# Patient Record
Sex: FEMALE | Race: WHITE | NOT HISPANIC OR LATINO | Employment: FULL TIME | URBAN - METROPOLITAN AREA
[De-identification: names, ages, dates, MRNs, and addresses within clinical notes are randomized per-mention and may not be internally consistent; named-entity substitution may affect disease eponyms.]

---

## 2017-09-27 ENCOUNTER — APPOINTMENT (OUTPATIENT)
Dept: WOMENS IMAGING | Facility: HOSPITAL | Age: 47
End: 2017-09-27

## 2017-09-27 PROCEDURE — G0202 SCR MAMMO BI INCL CAD: HCPCS | Performed by: RADIOLOGY

## 2017-09-27 PROCEDURE — 77067 SCR MAMMO BI INCL CAD: CPT | Performed by: RADIOLOGY

## 2017-09-27 PROCEDURE — 77063 BREAST TOMOSYNTHESIS BI: CPT | Performed by: RADIOLOGY

## 2018-01-04 ENCOUNTER — APPOINTMENT (OUTPATIENT)
Dept: WOMENS IMAGING | Facility: HOSPITAL | Age: 48
End: 2018-01-04

## 2018-01-04 PROCEDURE — 77080 DXA BONE DENSITY AXIAL: CPT | Performed by: RADIOLOGY

## 2018-06-12 ENCOUNTER — CLINICAL SUPPORT (OUTPATIENT)
Dept: OTHER | Facility: HOSPITAL | Age: 48
End: 2018-06-12

## 2018-06-12 DIAGNOSIS — Z80.3 FAMILY HISTORY OF BREAST CANCER: Primary | ICD-10-CM

## 2018-06-12 PROCEDURE — G0463 HOSPITAL OUTPT CLINIC VISIT: HCPCS

## 2018-06-12 NOTE — PATIENT INSTRUCTIONS
Pt seen by Dr. Saxena for genetic counseling and testing. Lab drawn with 21g butterfly needle in Left AC x 1 attempt. Sent to [facility]. Pt informed she would receive a phone call when test results and will be scheduled for a follow up appointment.

## 2018-08-01 ENCOUNTER — TRANSCRIBE ORDERS (OUTPATIENT)
Dept: ADMINISTRATIVE | Facility: HOSPITAL | Age: 48
End: 2018-08-01

## 2018-08-01 DIAGNOSIS — Z12.39 BREAST CANCER SCREENING: Primary | ICD-10-CM

## 2018-09-18 ENCOUNTER — HOSPITAL ENCOUNTER (OUTPATIENT)
Dept: MRI IMAGING | Facility: HOSPITAL | Age: 48
Discharge: HOME | End: 2018-09-18
Attending: INTERNAL MEDICINE | Admitting: INTERNAL MEDICINE

## 2018-09-18 DIAGNOSIS — Z12.39 BREAST CANCER SCREENING: ICD-10-CM

## 2018-09-18 PROCEDURE — 0 GADOBENATE DIMEGLUMINE 529 MG/ML SOLUTION: Performed by: INTERNAL MEDICINE

## 2018-09-18 PROCEDURE — A9577 INJ MULTIHANCE: HCPCS | Performed by: INTERNAL MEDICINE

## 2018-09-18 PROCEDURE — C8908 MRI W/O FOL W/CONT, BREAST,: HCPCS

## 2018-09-18 PROCEDURE — 82565 ASSAY OF CREATININE: CPT

## 2018-09-18 RX ADMIN — GADOBENATE DIMEGLUMINE 15 ML: 529 INJECTION, SOLUTION INTRAVENOUS at 19:24

## 2018-09-19 LAB — CREAT BLDA-MCNC: 0.9 MG/DL (ref 0.6–1.3)

## 2019-03-06 ENCOUNTER — APPOINTMENT (OUTPATIENT)
Dept: WOMENS IMAGING | Facility: HOSPITAL | Age: 49
End: 2019-03-06

## 2019-03-06 PROCEDURE — 77067 SCR MAMMO BI INCL CAD: CPT | Performed by: RADIOLOGY

## 2019-03-06 PROCEDURE — 77063 BREAST TOMOSYNTHESIS BI: CPT | Performed by: RADIOLOGY

## 2020-03-09 ENCOUNTER — APPOINTMENT (OUTPATIENT)
Dept: WOMENS IMAGING | Facility: HOSPITAL | Age: 50
End: 2020-03-09

## 2020-03-09 PROCEDURE — 77063 BREAST TOMOSYNTHESIS BI: CPT | Performed by: RADIOLOGY

## 2020-03-09 PROCEDURE — 77067 SCR MAMMO BI INCL CAD: CPT | Performed by: RADIOLOGY

## 2020-08-31 ENCOUNTER — TRANSCRIBE ORDERS (OUTPATIENT)
Dept: ADMINISTRATIVE | Facility: HOSPITAL | Age: 50
End: 2020-08-31

## 2020-08-31 DIAGNOSIS — Z80.3 FAMILY HX-BREAST MALIGNANCY: Primary | ICD-10-CM

## 2020-09-15 ENCOUNTER — HOSPITAL ENCOUNTER (OUTPATIENT)
Dept: MRI IMAGING | Facility: HOSPITAL | Age: 50
Discharge: HOME | End: 2020-09-15
Admitting: INTERNAL MEDICINE

## 2020-09-15 DIAGNOSIS — Z80.3 FAMILY HX-BREAST MALIGNANCY: ICD-10-CM

## 2020-09-15 LAB — CREAT BLDA-MCNC: 0.7 MG/DL (ref 0.6–1.3)

## 2020-09-15 PROCEDURE — 77049 MRI BREAST C-+ W/CAD BI: CPT

## 2020-09-15 PROCEDURE — A9577 INJ MULTIHANCE: HCPCS | Performed by: INTERNAL MEDICINE

## 2020-09-15 PROCEDURE — 82565 ASSAY OF CREATININE: CPT

## 2020-09-15 PROCEDURE — 0 GADOBENATE DIMEGLUMINE 529 MG/ML SOLUTION: Performed by: INTERNAL MEDICINE

## 2020-09-15 RX ADMIN — GADOBENATE DIMEGLUMINE 15 ML: 529 INJECTION, SOLUTION INTRAVENOUS at 16:24

## 2021-03-16 ENCOUNTER — APPOINTMENT (OUTPATIENT)
Dept: WOMENS IMAGING | Facility: HOSPITAL | Age: 51
End: 2021-03-16

## 2021-03-16 PROCEDURE — 77063 BREAST TOMOSYNTHESIS BI: CPT | Performed by: RADIOLOGY

## 2021-03-16 PROCEDURE — 77067 SCR MAMMO BI INCL CAD: CPT | Performed by: RADIOLOGY

## 2021-03-30 ENCOUNTER — BULK ORDERING (OUTPATIENT)
Dept: CASE MANAGEMENT | Facility: OTHER | Age: 51
End: 2021-03-30

## 2021-03-30 DIAGNOSIS — Z23 IMMUNIZATION DUE: ICD-10-CM

## 2021-09-01 ENCOUNTER — TRANSCRIBE ORDERS (OUTPATIENT)
Dept: ADMINISTRATIVE | Facility: HOSPITAL | Age: 51
End: 2021-09-01

## 2021-09-01 DIAGNOSIS — Z84.2: Primary | ICD-10-CM

## 2021-09-27 ENCOUNTER — HOSPITAL ENCOUNTER (OUTPATIENT)
Dept: MRI IMAGING | Facility: HOSPITAL | Age: 51
Discharge: HOME | End: 2021-09-27
Admitting: INTERNAL MEDICINE

## 2021-09-27 DIAGNOSIS — Z84.2: ICD-10-CM

## 2021-09-27 PROCEDURE — 0 GADOBENATE DIMEGLUMINE 529 MG/ML SOLUTION: Performed by: INTERNAL MEDICINE

## 2021-09-27 PROCEDURE — 77049 MRI BREAST C-+ W/CAD BI: CPT

## 2021-09-27 PROCEDURE — 82565 ASSAY OF CREATININE: CPT

## 2021-09-27 PROCEDURE — A9577 INJ MULTIHANCE: HCPCS | Performed by: INTERNAL MEDICINE

## 2021-09-27 RX ADMIN — GADOBENATE DIMEGLUMINE 13 ML: 529 INJECTION, SOLUTION INTRAVENOUS at 20:02

## 2021-09-29 ENCOUNTER — TRANSCRIBE ORDERS (OUTPATIENT)
Dept: ADMINISTRATIVE | Facility: HOSPITAL | Age: 51
End: 2021-09-29

## 2021-10-04 LAB — CREAT BLDA-MCNC: 0.8 MG/DL (ref 0.6–1.3)

## 2021-10-08 RX ORDER — CETIRIZINE HYDROCHLORIDE 10 MG/1
10 TABLET ORAL DAILY
COMMUNITY
End: 2021-12-14

## 2021-10-08 RX ORDER — ESCITALOPRAM OXALATE 20 MG/1
20 TABLET ORAL DAILY
COMMUNITY

## 2021-10-08 RX ORDER — MULTIPLE VITAMINS W/ MINERALS TAB 9MG-400MCG
1 TAB ORAL DAILY
COMMUNITY

## 2021-10-12 ENCOUNTER — HOSPITAL ENCOUNTER (OUTPATIENT)
Dept: MRI IMAGING | Facility: HOSPITAL | Age: 51
Discharge: HOME | End: 2021-10-12

## 2021-10-12 ENCOUNTER — HOSPITAL ENCOUNTER (OUTPATIENT)
Dept: MAMMOGRAPHY | Facility: HOSPITAL | Age: 51
Discharge: HOME | End: 2021-10-12

## 2021-10-12 VITALS
OXYGEN SATURATION: 100 % | HEIGHT: 61 IN | SYSTOLIC BLOOD PRESSURE: 122 MMHG | WEIGHT: 150 LBS | DIASTOLIC BLOOD PRESSURE: 86 MMHG | RESPIRATION RATE: 16 BRPM | BODY MASS INDEX: 28.32 KG/M2 | TEMPERATURE: 97.8 F | HEART RATE: 70 BPM

## 2021-10-12 DIAGNOSIS — Z98.890 STATUS POST BIOPSY: ICD-10-CM

## 2021-10-12 DIAGNOSIS — R92.8 ABNORMAL MRI, BREAST: ICD-10-CM

## 2021-10-12 LAB — CREAT BLDA-MCNC: 0.7 MG/DL (ref 0.6–1.3)

## 2021-10-12 PROCEDURE — 88342 IMHCHEM/IMCYTCHM 1ST ANTB: CPT | Performed by: INTERNAL MEDICINE

## 2021-10-12 PROCEDURE — 0 GADOBENATE DIMEGLUMINE 529 MG/ML SOLUTION: Performed by: INTERNAL MEDICINE

## 2021-10-12 PROCEDURE — 25010000003 LIDOCAINE 1 % SOLUTION: Performed by: INTERNAL MEDICINE

## 2021-10-12 PROCEDURE — 88341 IMHCHEM/IMCYTCHM EA ADD ANTB: CPT | Performed by: INTERNAL MEDICINE

## 2021-10-12 PROCEDURE — A4648 IMPLANTABLE TISSUE MARKER: HCPCS

## 2021-10-12 PROCEDURE — 88305 TISSUE EXAM BY PATHOLOGIST: CPT | Performed by: INTERNAL MEDICINE

## 2021-10-12 PROCEDURE — 82565 ASSAY OF CREATININE: CPT

## 2021-10-12 PROCEDURE — 77065 DX MAMMO INCL CAD UNI: CPT

## 2021-10-12 PROCEDURE — A9577 INJ MULTIHANCE: HCPCS | Performed by: INTERNAL MEDICINE

## 2021-10-12 RX ORDER — LIDOCAINE HYDROCHLORIDE 10 MG/ML
1 INJECTION, SOLUTION INFILTRATION; PERINEURAL ONCE
Status: COMPLETED | OUTPATIENT
Start: 2021-10-12 | End: 2021-10-12

## 2021-10-12 RX ORDER — DIAZEPAM 5 MG/1
5 TABLET ORAL ONCE AS NEEDED
Status: DISCONTINUED | OUTPATIENT
Start: 2021-10-12 | End: 2021-10-13 | Stop reason: HOSPADM

## 2021-10-12 RX ORDER — DIAZEPAM 5 MG/1
5 TABLET ORAL ONCE AS NEEDED
Status: CANCELLED | OUTPATIENT
Start: 2021-10-12

## 2021-10-12 RX ADMIN — LIDOCAINE HYDROCHLORIDE 1 ML: 10 INJECTION, SOLUTION INFILTRATION; PERINEURAL at 08:41

## 2021-10-12 RX ADMIN — LIDOCAINE HYDROCHLORIDE 16 ML: 10; .005 INJECTION, SOLUTION EPIDURAL; INFILTRATION; INTRACAUDAL; PERINEURAL at 10:08

## 2021-10-12 RX ADMIN — GADOBENATE DIMEGLUMINE 14 ML: 529 INJECTION, SOLUTION INTRAVENOUS at 08:23

## 2021-10-12 NOTE — H&P
Name: Mitra Celis ADMIT: 10/12/2021   : 1970  PCP: Garrick Oneal MD    MRN: [id number] LOS: 0 days   AGE/SEX: 51 y.o. female  ROOM: Room/bed info not found       Chief complaint right breast lesion on MRI    Present Illness or Internal History:  Patient is a 51 y.o. female presents with a right breast lesion on MRI.     Past Surgical History:  Past Surgical History:   Procedure Laterality Date   • DILATATION AND CURETTAGE     • ENDOMETRIAL CRYOABLATION     • MOLE REMOVAL     • SINUS SURGERY         Past Medical History:  Past Medical History:   Diagnosis Date   • Allergic rhinitis    • Anxiety and depression    • Headache        Home Medications:  (Not in a hospital admission)      Allergies:  Aleve [naproxen sodium] and Latex    Family History:  Family History   Problem Relation Age of Onset   • Breast cancer Mother    • Breast cancer Maternal Grandmother    • Diabetes Paternal Grandmother    • Breast cancer Maternal Aunt        Social History:  Social History     Tobacco Use   • Smoking status: Not on file   • Smokeless tobacco: Not on file   Substance Use Topics   • Alcohol use: Not on file   • Drug use: Not on file        Objective     Physical Exam:    No exam performed today,    Vital Signs  Temp:  [97.8 °F (36.6 °C)] 97.8 °F (36.6 °C)  Heart Rate:  [65] 65  Resp:  [16] 16  BP: (136)/(83) 136/83    Anticipated Surgical Procedure:  MRI guided vacuum assisted right breast biopsy with clip placement    The risks, benefits and alternatives of this procedure have been discussed with the patient or responsible party: Yes        Mayito Roldan Jr., MD  10/12/21  07:32 EDT

## 2021-10-12 NOTE — NURSING NOTE
Patient brought Valium 10 mg PO with her which was prescribed by her physician. Valium taken at 0730 a.m.

## 2021-10-12 NOTE — NURSING NOTE
Biopsy site to right outer breast clear with Dermabond dry and intact. No firmness or swelling noted at or around biopsy site. Denies pain. Tiny bruise present on contralateral side of the right breast. Ice pack with protective covering applied to biopsy site. Patient groggy but awake and oriented x4. Discharge instructions discussed with understanding voiced by patient. Copies provided to patient. No distress noted. To patient discharge via wheelchair per this nurse. To home via private vehicle driven by her .

## 2021-10-13 LAB
LAB AP CASE REPORT: NORMAL
LAB AP DIAGNOSIS COMMENT: NORMAL
LAB AP SPECIAL STAINS: NORMAL
PATH REPORT.FINAL DX SPEC: NORMAL
PATH REPORT.GROSS SPEC: NORMAL

## 2021-10-14 ENCOUNTER — TELEPHONE (OUTPATIENT)
Dept: SURGERY | Facility: CLINIC | Age: 51
End: 2021-10-14

## 2021-10-14 NOTE — TELEPHONE ENCOUNTER
New patient appointment with Dr. Costa is scheduled on 11/24/2021 @ 8:00am.    Called and spoke to patient, patient expressed v/u of appointment time.    Sent patient a reminder letter in the mail.

## 2021-11-24 ENCOUNTER — OFFICE VISIT (OUTPATIENT)
Dept: SURGERY | Facility: CLINIC | Age: 51
End: 2021-11-24

## 2021-11-24 ENCOUNTER — PREP FOR SURGERY (OUTPATIENT)
Dept: OTHER | Facility: HOSPITAL | Age: 51
End: 2021-11-24

## 2021-11-24 VITALS
WEIGHT: 157 LBS | OXYGEN SATURATION: 98 % | SYSTOLIC BLOOD PRESSURE: 141 MMHG | BODY MASS INDEX: 29.64 KG/M2 | DIASTOLIC BLOOD PRESSURE: 86 MMHG | HEIGHT: 61 IN | RESPIRATION RATE: 18 BRPM | HEART RATE: 75 BPM

## 2021-11-24 DIAGNOSIS — N60.91 ATYPICAL LOBULAR HYPERPLASIA (ALH) OF RIGHT BREAST: Primary | ICD-10-CM

## 2021-11-24 DIAGNOSIS — Z91.89 INCREASED RISK OF BREAST CANCER: ICD-10-CM

## 2021-11-24 PROCEDURE — 99205 OFFICE O/P NEW HI 60 MIN: CPT | Performed by: SURGERY

## 2021-11-24 RX ORDER — DIAZEPAM 5 MG/1
10 TABLET ORAL ONCE
Status: CANCELLED | OUTPATIENT
Start: 2021-11-24 | End: 2021-11-24

## 2021-11-24 NOTE — PROGRESS NOTES
[facility]     Referring Provider: Garrick Oneal MD     Chief complaint: Right breast atypical lobular hyperplasia      HPI: Ms. Mitra Celis is a 52 yo woman, seen at the request of Dr. Garrick Oneal, for a new diagnosis of right breast atypical lobular hyperplasia (ALH). The patient has been in high risk screening for breast cancer for the past few years. On recent routine screening MRI, there was a new area of right breast enhancement. Subsequent biopsy showed focal ALH. Her work-up is detailed in the breast history section below. Prior to the biopsy she denies any breast lumps, pain, skin changes, or nipple discharge. She denies any prior history of abnormal mammograms or breast biopsies.     She has a family history of breast cancer in her mother (diagnosed at age 60 and at 81 with a second primary cancer in the contralateral breast), her maternal grandmother (diagnosed in her 60s) and maternal great grandmother (unknown age of diagnosis). She denies any family history of ovarian cancer. Both the patient and her mother had negative expanded panel genetic testing. She was joined today in clinic by her . She gave consent for him to be present during her examination and participate in the discussion.       I personally reviewed her records and summarized her relevant breast history/imagin2018, [facility] Common Hereditary Cancers Panel (46 genes): Negative    2020, Screening MMG with M[facility]ep ([facility]):  Heterogeneously dense. There are stable areas of asymmetric breast tissue seen in both breasts. No suspicious masses, suspicious microcalcifications or architectural distortions are identified. There has been no significant change from the prior exam(s).  BI-RADS 2: Benign.    09/15/2020, Bilateral Breast MRI ([facility]):  Right breast: No suspicious enhancing mass or area of nonmass enhancement is identified. The visualized axilla is within normal limits.   Left breast: No suspicious  enhancing mass or nonmass enhancement is identified. The visualized axilla is within normal limits.   Extramammary findings: There are abnormally enlarged internal mammary chain lymph nodes on either side.  BI-RADS 1: Negative.    03/16/2021, Screening MMG with Mandeep ([facility]):  Heterogeneously dense. No suspicious masses, significant calcifications or other abnormalities are seen.  BI-RADS 1: Negative.    09/27/2021, [facility]  Centered in the posterior one third lower inner quadrant of the right breast at the 3:30 position on the order of 5 cm from the nipple there is linear enhancement that measures on the order of 2.1 cm in the anterior posterior dimension, 0.5 cm in the superior-inferior dimension and 0.4 cm in the medial to lateral dimension. This has developed since the prior examination.   No other areas of abnormal enhancement or morphology are seen in the right breast. I see no evidence for abnormal right breast skin, nipple or chest wall enhancement and there is no evidence for right axillary or internal mammary chain adenopathy.   There are no areas of abnormal enhancement or morphology in the left breast. I see no evidence for abnormal left breast skin, nipple or chest wall enhancement and there is no evidence for left axillary or internal mammary chain adenopathy.  BI-RADS 4: Suspicious.    10/12/2021, Right Breast, MR-Guided Biopsy ( [facility]):  1.  Right Breast, 3:30, MRI-Guided Biopsies for Linear Enhancement:                A.  Benign breast parenchyma with scattered hyalinized stromal fibrosis and focal clustered microcysts.  B.  Focal atypical lobular hyperplasia (ALH).  C.  Microcalcifications associated with benign breast ducts.  D.  No evidence of in situ nor invasive carcinoma identified.      Review of Systems   Constitutional: Negative for appetite change, chills, diaphoresis, fatigue, fever and unexpected weight change.   HENT:   Negative for hearing loss, lump/mass,  mouth sores, nosebleeds, sore throat, tinnitus, trouble swallowing and voice change.    Eyes: Negative for eye problems and icterus.   Respiratory: Negative for chest tightness, cough, hemoptysis, shortness of breath and wheezing.    Cardiovascular: Negative for chest pain, leg swelling and palpitations.   Gastrointestinal: Negative for abdominal distention, abdominal pain, blood in stool, constipation, diarrhea, nausea, rectal pain and vomiting.   Endocrine: Negative for hot flashes.   Genitourinary: Negative for bladder incontinence, difficulty urinating, dyspareunia, dysuria, frequency, hematuria, menstrual problem, nocturia, pelvic pain, vaginal bleeding and vaginal discharge.    Musculoskeletal: Negative for arthralgias, back pain, flank pain, gait problem, myalgias, neck pain and neck stiffness.   Skin: Negative for itching, rash and wound.   Neurological: Negative for dizziness, extremity weakness, gait problem, headaches, light-headedness, numbness, seizures and speech difficulty.   Hematological: Negative for adenopathy. Does not bruise/bleed easily.   Psychiatric/Behavioral: Negative for confusion, decreased concentration, depression, sleep disturbance and suicidal ideas. The patient is not nervous/anxious.    I personally reviewed and updated the ROS.      Medications:    Current Outpatient Medications:   •  B COMPLEX VITAMINS PO, Take 1 tablet by mouth Daily., Disp: , Rfl:   •  cetirizine (zyrTEC) 10 MG tablet, Take 10 mg by mouth Daily., Disp: , Rfl:   •  escitalopram (LEXAPRO) 20 MG tablet, Take 20 mg by mouth Daily., Disp: , Rfl:   •  multivitamin with minerals (MULTIVITAMIN ADULT PO), Take 1 tablet by mouth Daily., Disp: , Rfl:       Allergies   Allergen Reactions   • Aleve [Naproxen Sodium] Anaphylaxis and Other (See Comments)     syncope   • Latex Rash     rash       Past Medical History:   Diagnosis Date   • Allergic rhinitis    • Anxiety and depression    • Headache        Past Surgical History:    Procedure Laterality Date   • DILATATION AND CURETTAGE     • ENDOMETRIAL CRYOABLATION     • MOLE REMOVAL     • SINUS SURGERY         Family History   Problem Relation Age of Onset   • Breast cancer Mother         Diagnosed at 60 and 80    • Breast cancer Maternal Grandmother         Daignosed in her 60s   • Diabetes Paternal Grandmother    • Cancer Maternal Aunt         Spine Cancer    • Breast cancer Maternal Great-Grandmother        Social History     Socioeconomic History   • Marital status:    • Number of children: 0   Tobacco Use   • Smoking status: Never Smoker   • Smokeless tobacco: Never Used   Substance and Sexual Activity   • Alcohol use: Yes     Comment: Socail ([place])   • Drug use: Never   • Sexual activity: Defer     Patient drinks 3 servings of caffeine per day.     GYNECOLOGIC HISTORY:   . P: 0. AB: 1.  Last menstrual period: Postmenopausal  Age at menarche: 10  Age at first childbirth: n/a  Lactation/How long: n/a  Age at menopause: late 40s   Total years of oral contraceptive use: 20  Total years of hormone replacement therapy: 0      Physical Exam  Vitals:    21 0759   BP: 141/86   Pulse: 75   Resp: 18   SpO2: 98%     ECOG 0 - Asymptomatic  General: NAD, well appearing  Psych: a&o x 3, normal mood and affect  Eyes: EOMI, no scleral icterus  ENMT: neck supple without masses or thyromegaly, mucus membranes moist  Resp: normal effort, CTAB  CV: RRR, no murmurs, no edema  GI: soft, NT, ND  MSK: normal gait, normal ROM in bilateral shoulders  Lymph nodes: no cervical, supraclavicular or axillary lymphadenopathy  Breast: symmetric, moderate size, grade 2 ptosis  Right: UIQ horizontal scar from prior mole removal, otherwise no visible abnormalities on inspection while seated, with arms raised or hands on hips. No masses or nipple abnormalities.  Left: No visible abnormalities on inspection while seated, with arms raised or hands on hips. No masses, skin changes, or nipple  abnormalities.    Right breast, in-office ultrasound: At 3:30, 4.5 cm FN, there is a small postbiopsy hematoma with biopsy clip visualized. This is located about 1 cm deep to the skin.       I have independently reviewed her imaging and here are my findings:   In the right medial breast, there initially was a 2.1 cm area of linear enhancement on MRI.      Assessment:  51 y.o. F with a new diagnosis of right breast atypical lobular hyperplasia. She also has an increased lifetime risk of breast cancer (37%, TCv8, calculated 11/24/21).    Discussion:  We reviewed her pathology and imaging reports. I explained that I do not routinely recommend excision of ALH, if the situation meets certain criteria and the risk of upgrade is low. However in her situation, since this was associated with 2.1 cm of linear enhancement, I do think there is a risk of upgrade (~15% risk of identifying DCIS or invasive carcinoma within the vicinity of the lesion). This cannot be recognized on the small volume of tissue obtained at percutaneous biopsy. I would recommend excisional biopsy with more generous tissue sampling and pathologic analysis. She is in agreement with this plan.     I explained that excisional biopsy would require preoperative wire-localization. We discussed that should the final pathology be upgraded, she would likely require an additional operation. We reviewed additional risks and potential complications associated with surgery, including, but not limited to, bleeding, infection, deformity or poor cosmetic result, chronic pain, numbness, seroma, hematoma, altered nipple sensation, deep venous thrombosis, and skin flap necrosis. We reviewed postoperative wound care and activity restrictions. She expressed an understanding of these factors and wished to proceed.    We also discussed the fact that the diagnosis of atypical hyperplasia increases her lifetime risk of breast cancer bilaterally. I explained that breast cancer  risk is assessed by considering multiple factors, including her family history and personal history, which now includes the diagnosis of ALH. Since she had negative genetic testing, the best way to estimate her risk of breast cancer would be using a predictive model. We discussed the various models for calculating breast cancer risk, that none of them are perfect and that some overestimate or underestimate. I calculated her lifetime risk of breast cancer, which is 36.7% (TCv8). We discussed that breast cancer risk needs to be continually reassessed throughout her lifetime.     We discussed management options for individuals who are at increased risk:  1) High risk screening - Annual mammogram and annual breast MRI, alternating one test every 6 months, biannual clinical exam and monthly self breast exam.   -She has already been doing this and should continue with high risk screening.  2) Chemoprevention with Tamoxifen, Raloxifene or Exemestane. These may reduce risk up to 50%. I reviewed that these particular medications are not without risks and the risk/benefit ratio must be considered carefully.   -She is open to discussing this with a medical oncologist postoperatively.  3) Risk reducing surgery such as prophylactic mastectomy which may reduce risk by 90-95%. We discussed that this is a relatively radical strategy and is generally reserved for individuals with a known genetic mutation predisposing them to an increased risk (~50% risk) of breast cancer.  -Since her genetic testing was negative, she does not meet criteria for risk reducing surgery.    Plan:  -Right breast needle-localized excisional biopsy.  -Medical oncology referral postoperatively to discuss possible chemoprevention.  -Continue high risk screening postoperatively and likely refer to high risk breast clinic.    Kathy Costa MD    I spent 75 minutes caring for Mitra on this date of service. This time includes time spent by me in the  following activities: preparing for the visit, performing a medically appropriate examination and/or evaluation , counseling and educating the patient/family/caregiver, documenting information in the medical record and independently interpreting results and communicating that information with the patient/family/caregiver.      CC:  MD Candace Knowles MD

## 2021-11-29 ENCOUNTER — TRANSCRIBE ORDERS (OUTPATIENT)
Dept: SURGERY | Facility: CLINIC | Age: 51
End: 2021-11-29

## 2021-11-29 DIAGNOSIS — N60.91 ATYPICAL LOBULAR HYPERPLASIA (ALH) OF RIGHT BREAST: Primary | ICD-10-CM

## 2021-11-30 ENCOUNTER — TELEPHONE (OUTPATIENT)
Dept: SURGERY | Facility: CLINIC | Age: 51
End: 2021-11-30

## 2021-11-30 NOTE — TELEPHONE ENCOUNTER
Surgery is scheduled on 12/20/2021 @ 12:30pm, NL @ 11:00am, patient arrival 9:00am.    PAT is scheduled on 12/14/2021 @ 8:00am.    Post-op appointment with Dr. Costa is scheduled on 1/7/2022 @ 10:00am.    Called and spoke to patient, patient expressed v/u of appointment times.    Sent patient a reminder letter in the mail.

## 2021-12-14 ENCOUNTER — PRE-ADMISSION TESTING (OUTPATIENT)
Dept: PREADMISSION TESTING | Facility: HOSPITAL | Age: 51
End: 2021-12-14

## 2021-12-14 VITALS
RESPIRATION RATE: 16 BRPM | WEIGHT: 157 LBS | SYSTOLIC BLOOD PRESSURE: 135 MMHG | HEART RATE: 78 BPM | DIASTOLIC BLOOD PRESSURE: 88 MMHG | TEMPERATURE: 98.1 F | HEIGHT: 61 IN | BODY MASS INDEX: 29.64 KG/M2 | OXYGEN SATURATION: 98 %

## 2021-12-14 DIAGNOSIS — N60.91 ATYPICAL LOBULAR HYPERPLASIA (ALH) OF RIGHT BREAST: ICD-10-CM

## 2021-12-14 LAB
ANION GAP SERPL CALCULATED.3IONS-SCNC: 6.9 MMOL/L (ref 5–15)
BASOPHILS # BLD AUTO: 0.02 10*3/MM3 (ref 0–0.2)
BASOPHILS NFR BLD AUTO: 0.3 % (ref 0–1.5)
BUN SERPL-MCNC: 12 MG/DL (ref 6–20)
BUN/CREAT SERPL: 16.7 (ref 7–25)
CALCIUM SPEC-SCNC: 9.6 MG/DL (ref 8.6–10.5)
CHLORIDE SERPL-SCNC: 103 MMOL/L (ref 98–107)
CO2 SERPL-SCNC: 31.1 MMOL/L (ref 22–29)
CREAT SERPL-MCNC: 0.72 MG/DL (ref 0.57–1)
DEPRECATED RDW RBC AUTO: 43 FL (ref 37–54)
EOSINOPHIL # BLD AUTO: 0.23 10*3/MM3 (ref 0–0.4)
EOSINOPHIL NFR BLD AUTO: 3.8 % (ref 0.3–6.2)
ERYTHROCYTE [DISTWIDTH] IN BLOOD BY AUTOMATED COUNT: 12.9 % (ref 12.3–15.4)
GFR SERPL CREATININE-BSD FRML MDRD: 85 ML/MIN/1.73
GLUCOSE SERPL-MCNC: 101 MG/DL (ref 65–99)
HCG SERPL QL: NEGATIVE
HCT VFR BLD AUTO: 46.6 % (ref 34–46.6)
HGB BLD-MCNC: 15.5 G/DL (ref 12–15.9)
IMM GRANULOCYTES # BLD AUTO: 0.01 10*3/MM3 (ref 0–0.05)
IMM GRANULOCYTES NFR BLD AUTO: 0.2 % (ref 0–0.5)
LYMPHOCYTES # BLD AUTO: 1.61 10*3/MM3 (ref 0.7–3.1)
LYMPHOCYTES NFR BLD AUTO: 26.8 % (ref 19.6–45.3)
MCH RBC QN AUTO: 30.7 PG (ref 26.6–33)
MCHC RBC AUTO-ENTMCNC: 33.3 G/DL (ref 31.5–35.7)
MCV RBC AUTO: 92.3 FL (ref 79–97)
MONOCYTES # BLD AUTO: 0.36 10*3/MM3 (ref 0.1–0.9)
MONOCYTES NFR BLD AUTO: 6 % (ref 5–12)
NEUTROPHILS NFR BLD AUTO: 3.78 10*3/MM3 (ref 1.7–7)
NEUTROPHILS NFR BLD AUTO: 62.9 % (ref 42.7–76)
NRBC BLD AUTO-RTO: 0 /100 WBC (ref 0–0.2)
PLATELET # BLD AUTO: 231 10*3/MM3 (ref 140–450)
PMV BLD AUTO: 10.6 FL (ref 6–12)
POTASSIUM SERPL-SCNC: 5.2 MMOL/L (ref 3.5–5.2)
QT INTERVAL: 406 MS
RBC # BLD AUTO: 5.05 10*6/MM3 (ref 3.77–5.28)
SODIUM SERPL-SCNC: 141 MMOL/L (ref 136–145)
WBC NRBC COR # BLD: 6.01 10*3/MM3 (ref 3.4–10.8)

## 2021-12-14 PROCEDURE — 93005 ELECTROCARDIOGRAM TRACING: CPT

## 2021-12-14 PROCEDURE — 84703 CHORIONIC GONADOTROPIN ASSAY: CPT

## 2021-12-14 PROCEDURE — 85025 COMPLETE CBC W/AUTO DIFF WBC: CPT

## 2021-12-14 PROCEDURE — 80048 BASIC METABOLIC PNL TOTAL CA: CPT

## 2021-12-14 PROCEDURE — 36415 COLL VENOUS BLD VENIPUNCTURE: CPT

## 2021-12-14 PROCEDURE — 93010 ELECTROCARDIOGRAM REPORT: CPT | Performed by: INTERNAL MEDICINE

## 2021-12-14 RX ORDER — FAMOTIDINE 20 MG/1
40 TABLET, FILM COATED ORAL DAILY PRN
COMMUNITY

## 2021-12-14 RX ORDER — FLUTICASONE PROPIONATE 50 MCG
2 SPRAY, SUSPENSION (ML) NASAL DAILY PRN
COMMUNITY

## 2021-12-14 NOTE — DISCHARGE INSTRUCTIONS
Arrive to hospital on your day of surgery at 9AM    Take the following medications the morning of surgery: LEXAPRO AND FAMOTIDINE      If you are on prescription narcotic pain medication to control your pain you may also take that medication the morning of surgery.    General Instructions:  • Do not eat solid food after midnight the night before surgery.  • You may drink clear liquids day of surgery but must stop at least one hour before your hospital arrival time.  • It is beneficial for you to have a clear drink that contains carbohydrates the day of surgery.  We suggest a 12 to 20 ounce bottle of Gatorade or Powerade for non-diabetic patients or a 12 to 20 ounce bottle of G2 or Powerade Zero for diabetic patients. (Pediatric patients, are not advised to drink a 12 to 20 ounce carbohydrate drink)    Clear liquids are liquids you can see through.  Nothing red in color.     Plain water                               Sports drinks  Sodas                                   Gelatin (Jell-O)  Fruit juices without pulp such as white grape juice and apple juice  Popsicles that contain no fruit or yogurt  Tea or coffee (no cream or milk added)  Gatorade / Powerade  G2 / Powerade Zero    • Infants may have breast milk up to four hours before surgery.  • Infants drinking formula may drink formula up to six hours before surgery.   • Patients who avoid smoking, chewing tobacco and alcohol for 4 weeks prior to surgery have a reduced risk of post-operative complications.  Quit smoking as many days before surgery as you can.  • Do not smoke, use chewing tobacco or drink alcohol the day of surgery.   • If applicable bring your C-PAP/ BI-PAP machine.  • Bring any papers given to you in the doctor’s office.  • Wear clean comfortable clothes.  • Do not wear contact lenses, false eyelashes or make-up.  Bring a case for your glasses.   • Bring crutches or walker if applicable.  • Remove all piercings.  Leave jewelry and any other  valuables at home.  • Hair extensions with metal clips must be removed prior to surgery.  • The Pre-Admission Testing nurse will instruct you to bring medications if unable to obtain an accurate list in Pre-Admission Testing.        If you were given a blood bank ID arm band remember to bring it with you the day of surgery.    Preventing a Surgical Site Infection:  • For 2 to 3 days before surgery, avoid shaving with a razor because the razor can irritate skin and make it easier to develop an infection.    • Any areas of open skin can increase the risk of a post-operative wound infection by allowing bacteria to enter and travel throughout the body.  Notify your surgeon if you have any skin wounds / rashes even if it is not near the expected surgical site.  The area will need assessed to determine if surgery should be delayed until it is healed.  • The night prior to surgery shower using a fresh bar of anti-bacterial soap (such as Dial) and clean washcloth.  Sleep in a clean bed with clean clothing.  Do not allow pets to sleep with you.  • Shower on the morning of surgery using a fresh bar of anti-bacterial soap (such as Dial) and clean washcloth.  Dry with a clean towel and dress in clean clothing.  • Ask your surgeon if you will be receiving antibiotics prior to surgery.  • Make sure you, your family, and all healthcare providers clean their hands with soap and water or an alcohol based hand  before caring for you or your wound.    Day of surgery:  Your arrival time is approximately two hours before your scheduled surgery time.  Upon arrival, a Pre-op nurse and Anesthesiologist will review your health history, obtain vital signs, and answer questions you may have.  The only belongings needed at this time will be a list of your home medications and if applicable your C-PAP/BI-PAP machine.  A Pre-op nurse will start an IV and you may receive medication in preparation for surgery, including something to help  you relax.     Please be aware that surgery does come with discomfort.  We want to make every effort to control your discomfort so please discuss any uncontrolled symptoms with your nurse.   Your doctor will most likely have prescribed pain medications.      If you are going home after surgery you will receive individualized written care instructions before being discharged.  A responsible adult must drive you to and from the hospital on the day of your surgery and stay with you for 24 hours.  Discharge prescriptions can be filled by the hospital pharmacy during regular pharmacy hours.  If you are having surgery late in the day/evening your prescription may be e-prescribed to your pharmacy.  Please verify your pharmacy hours or chose a 24 hour pharmacy to avoid not having access to your prescription because your pharmacy has closed for the day.    If you are staying overnight following surgery, you will be transported to your hospital room following the recovery period.  [facility] has all private rooms.    If you have any questions please call Pre-Admission Testing at [phone].  Deductibles and co-payments are collected on the day of service. Please be prepared to pay the required co-pay, deductible or deposit on the day of service as defined by your plan.    Patient Education for Self-Quarantine Process    • Following your COVID testing, we strongly recommend that you wear a mask when you are with other people and practice social distancing.   • Limit your activities to only required outings.  • Wash your hands with soap and water frequently for at least 20 seconds.   • Avoid touching your eyes, nose and mouth with unwashed hands.  • Do not share anything - utensils, drinking glasses, food from the same bowl.   • Sanitize household surfaces daily. Include all high touch areas (door handles, light switches, phones, countertops, etc.)    Call your surgeon immediately if you experience any of the  following symptoms:  • Sore Throat  • Shortness of Breath or difficulty breathing  • Cough  • Chills  • Body soreness or muscle pain  • Headache  • Fever  • New loss of taste or smell  • Do not arrive for your surgery ill.  Your procedure will need to be rescheduled to another time.  You will need to call your physician before the day of surgery to avoid any unnecessary exposure to hospital staff as well as other patients.

## 2021-12-17 ENCOUNTER — TELEPHONE (OUTPATIENT)
Dept: SURGERY | Facility: CLINIC | Age: 51
End: 2021-12-17

## 2021-12-17 ENCOUNTER — LAB (OUTPATIENT)
Dept: LAB | Facility: HOSPITAL | Age: 51
End: 2021-12-17

## 2021-12-17 DIAGNOSIS — N60.91 ATYPICAL LOBULAR HYPERPLASIA (ALH) OF RIGHT BREAST: ICD-10-CM

## 2021-12-17 LAB — SARS-COV-2 ORF1AB RESP QL NAA+PROBE: NOT DETECTED

## 2021-12-17 PROCEDURE — C9803 HOPD COVID-19 SPEC COLLECT: HCPCS

## 2021-12-17 PROCEDURE — U0004 COV-19 TEST NON-CDC HGH THRU: HCPCS

## 2021-12-17 NOTE — TELEPHONE ENCOUNTER
Called and left message with patient to remind her that her arrival time for surgery is now 10:30am on 12/20/2021.    Patient to call back and confirm.

## 2021-12-20 ENCOUNTER — ANESTHESIA EVENT (OUTPATIENT)
Dept: PERIOP | Facility: HOSPITAL | Age: 51
End: 2021-12-20

## 2021-12-20 ENCOUNTER — ANESTHESIA (OUTPATIENT)
Dept: PERIOP | Facility: HOSPITAL | Age: 51
End: 2021-12-20

## 2021-12-20 ENCOUNTER — HOSPITAL ENCOUNTER (OUTPATIENT)
Dept: MAMMOGRAPHY | Facility: HOSPITAL | Age: 51
Discharge: HOME | End: 2021-12-20

## 2021-12-20 ENCOUNTER — APPOINTMENT (OUTPATIENT)
Dept: GENERAL RADIOLOGY | Facility: HOSPITAL | Age: 51
End: 2021-12-20

## 2021-12-20 ENCOUNTER — HOSPITAL ENCOUNTER (OUTPATIENT)
Facility: HOSPITAL | Age: 51
Setting detail: HOSPITAL OUTPATIENT SURGERY
Discharge: HOME | End: 2021-12-20
Attending: SURGERY | Admitting: SURGERY

## 2021-12-20 VITALS
DIASTOLIC BLOOD PRESSURE: 96 MMHG | RESPIRATION RATE: 16 BRPM | OXYGEN SATURATION: 96 % | BODY MASS INDEX: 29.76 KG/M2 | TEMPERATURE: 97.6 F | SYSTOLIC BLOOD PRESSURE: 151 MMHG | HEIGHT: 61 IN | WEIGHT: 157.6 LBS | HEART RATE: 72 BPM

## 2021-12-20 DIAGNOSIS — N60.91 ATYPICAL LOBULAR HYPERPLASIA (ALH) OF RIGHT BREAST: ICD-10-CM

## 2021-12-20 LAB
B-HCG UR QL: NEGATIVE
EXPIRATION DATE: NORMAL
INTERNAL NEGATIVE CONTROL: NORMAL
INTERNAL POSITIVE CONTROL: NORMAL
Lab: NORMAL

## 2021-12-20 PROCEDURE — 25010000002 FENTANYL CITRATE (PF) 50 MCG/ML SOLUTION: Performed by: ANESTHESIOLOGY

## 2021-12-20 PROCEDURE — 81025 URINE PREGNANCY TEST: CPT | Performed by: ANESTHESIOLOGY

## 2021-12-20 PROCEDURE — C1819 TISSUE LOCALIZATION-EXCISION: HCPCS

## 2021-12-20 PROCEDURE — 25010000002 ONDANSETRON PER 1 MG: Performed by: ANESTHESIOLOGY

## 2021-12-20 PROCEDURE — 88342 IMHCHEM/IMCYTCHM 1ST ANTB: CPT | Performed by: SURGERY

## 2021-12-20 PROCEDURE — 76098 X-RAY EXAM SURGICAL SPECIMEN: CPT

## 2021-12-20 PROCEDURE — 25010000002 PROPOFOL 10 MG/ML EMULSION: Performed by: ANESTHESIOLOGY

## 2021-12-20 PROCEDURE — 88341 IMHCHEM/IMCYTCHM EA ADD ANTB: CPT | Performed by: SURGERY

## 2021-12-20 PROCEDURE — 19125 EXCISION BREAST LESION: CPT | Performed by: SURGERY

## 2021-12-20 PROCEDURE — 0 LIDOCAINE 1 % SOLUTION: Performed by: SURGERY

## 2021-12-20 PROCEDURE — 88307 TISSUE EXAM BY PATHOLOGIST: CPT | Performed by: SURGERY

## 2021-12-20 PROCEDURE — 0 CEFAZOLIN IN DEXTROSE 2-4 GM/100ML-% SOLUTION: Performed by: ANESTHESIOLOGY

## 2021-12-20 RX ORDER — PROPOFOL 10 MG/ML
VIAL (ML) INTRAVENOUS AS NEEDED
Status: DISCONTINUED | OUTPATIENT
Start: 2021-12-20 | End: 2021-12-20 | Stop reason: SURG

## 2021-12-20 RX ORDER — FLUMAZENIL 0.1 MG/ML
0.2 INJECTION INTRAVENOUS AS NEEDED
Status: DISCONTINUED | OUTPATIENT
Start: 2021-12-20 | End: 2021-12-20 | Stop reason: HOSPADM

## 2021-12-20 RX ORDER — ACETAMINOPHEN 500 MG
1000 TABLET ORAL EVERY 8 HOURS
Qty: 30 TABLET | Refills: 0 | Status: SHIPPED | OUTPATIENT
Start: 2021-12-20 | End: 2021-12-25

## 2021-12-20 RX ORDER — PROMETHAZINE HYDROCHLORIDE 25 MG/1
25 TABLET ORAL ONCE AS NEEDED
Status: DISCONTINUED | OUTPATIENT
Start: 2021-12-20 | End: 2021-12-20 | Stop reason: HOSPADM

## 2021-12-20 RX ORDER — OXYCODONE AND ACETAMINOPHEN 7.5; 325 MG/1; MG/1
1 TABLET ORAL EVERY 4 HOURS PRN
Status: DISCONTINUED | OUTPATIENT
Start: 2021-12-20 | End: 2021-12-20 | Stop reason: HOSPADM

## 2021-12-20 RX ORDER — HYDRALAZINE HYDROCHLORIDE 20 MG/ML
5 INJECTION INTRAMUSCULAR; INTRAVENOUS
Status: DISCONTINUED | OUTPATIENT
Start: 2021-12-20 | End: 2021-12-20 | Stop reason: HOSPADM

## 2021-12-20 RX ORDER — HYDROMORPHONE HYDROCHLORIDE 1 MG/ML
0.5 INJECTION, SOLUTION INTRAMUSCULAR; INTRAVENOUS; SUBCUTANEOUS
Status: DISCONTINUED | OUTPATIENT
Start: 2021-12-20 | End: 2021-12-20 | Stop reason: HOSPADM

## 2021-12-20 RX ORDER — FENTANYL CITRATE 50 UG/ML
INJECTION, SOLUTION INTRAMUSCULAR; INTRAVENOUS AS NEEDED
Status: DISCONTINUED | OUTPATIENT
Start: 2021-12-20 | End: 2021-12-20 | Stop reason: SURG

## 2021-12-20 RX ORDER — ONDANSETRON 2 MG/ML
INJECTION INTRAMUSCULAR; INTRAVENOUS AS NEEDED
Status: DISCONTINUED | OUTPATIENT
Start: 2021-12-20 | End: 2021-12-20 | Stop reason: SURG

## 2021-12-20 RX ORDER — DIAZEPAM 5 MG/1
10 TABLET ORAL ONCE
Status: COMPLETED | OUTPATIENT
Start: 2021-12-20 | End: 2021-12-20

## 2021-12-20 RX ORDER — LIDOCAINE HYDROCHLORIDE 10 MG/ML
3 INJECTION, SOLUTION INFILTRATION; PERINEURAL ONCE
Status: COMPLETED | OUTPATIENT
Start: 2021-12-20 | End: 2021-12-20

## 2021-12-20 RX ORDER — OXYCODONE HYDROCHLORIDE 5 MG/1
5 TABLET ORAL EVERY 6 HOURS PRN
Qty: 6 TABLET | Refills: 0 | Status: SHIPPED | OUTPATIENT
Start: 2021-12-20 | End: 2022-01-06

## 2021-12-20 RX ORDER — DIPHENHYDRAMINE HYDROCHLORIDE 50 MG/ML
12.5 INJECTION INTRAMUSCULAR; INTRAVENOUS
Status: DISCONTINUED | OUTPATIENT
Start: 2021-12-20 | End: 2021-12-20 | Stop reason: HOSPADM

## 2021-12-20 RX ORDER — HYDROCODONE BITARTRATE AND ACETAMINOPHEN 7.5; 325 MG/1; MG/1
1 TABLET ORAL ONCE AS NEEDED
Status: DISCONTINUED | OUTPATIENT
Start: 2021-12-20 | End: 2021-12-20 | Stop reason: HOSPADM

## 2021-12-20 RX ORDER — SODIUM CHLORIDE 0.9 % (FLUSH) 0.9 %
3-10 SYRINGE (ML) INJECTION AS NEEDED
Status: DISCONTINUED | OUTPATIENT
Start: 2021-12-20 | End: 2021-12-20 | Stop reason: HOSPADM

## 2021-12-20 RX ORDER — SODIUM CHLORIDE 0.9 % (FLUSH) 0.9 %
3 SYRINGE (ML) INJECTION EVERY 12 HOURS SCHEDULED
Status: DISCONTINUED | OUTPATIENT
Start: 2021-12-20 | End: 2021-12-20 | Stop reason: HOSPADM

## 2021-12-20 RX ORDER — LIDOCAINE HYDROCHLORIDE 20 MG/ML
INJECTION, SOLUTION INFILTRATION; PERINEURAL AS NEEDED
Status: DISCONTINUED | OUTPATIENT
Start: 2021-12-20 | End: 2021-12-20 | Stop reason: SURG

## 2021-12-20 RX ORDER — FAMOTIDINE 10 MG/ML
20 INJECTION, SOLUTION INTRAVENOUS ONCE
Status: COMPLETED | OUTPATIENT
Start: 2021-12-20 | End: 2021-12-20

## 2021-12-20 RX ORDER — BUPIVACAINE HYDROCHLORIDE 2.5 MG/ML
INJECTION, SOLUTION EPIDURAL; INFILTRATION; INTRACAUDAL AS NEEDED
Status: DISCONTINUED | OUTPATIENT
Start: 2021-12-20 | End: 2021-12-20 | Stop reason: HOSPADM

## 2021-12-20 RX ORDER — FENTANYL CITRATE 50 UG/ML
50 INJECTION, SOLUTION INTRAMUSCULAR; INTRAVENOUS
Status: DISCONTINUED | OUTPATIENT
Start: 2021-12-20 | End: 2021-12-20 | Stop reason: HOSPADM

## 2021-12-20 RX ORDER — MAGNESIUM HYDROXIDE 1200 MG/15ML
LIQUID ORAL AS NEEDED
Status: DISCONTINUED | OUTPATIENT
Start: 2021-12-20 | End: 2021-12-20 | Stop reason: HOSPADM

## 2021-12-20 RX ORDER — LABETALOL HYDROCHLORIDE 5 MG/ML
5 INJECTION, SOLUTION INTRAVENOUS
Status: DISCONTINUED | OUTPATIENT
Start: 2021-12-20 | End: 2021-12-20 | Stop reason: HOSPADM

## 2021-12-20 RX ORDER — MIDAZOLAM HYDROCHLORIDE 1 MG/ML
1 INJECTION INTRAMUSCULAR; INTRAVENOUS
Status: DISCONTINUED | OUTPATIENT
Start: 2021-12-20 | End: 2021-12-20 | Stop reason: HOSPADM

## 2021-12-20 RX ORDER — ONDANSETRON 2 MG/ML
4 INJECTION INTRAMUSCULAR; INTRAVENOUS ONCE AS NEEDED
Status: DISCONTINUED | OUTPATIENT
Start: 2021-12-20 | End: 2021-12-20 | Stop reason: HOSPADM

## 2021-12-20 RX ORDER — DIPHENHYDRAMINE HCL 25 MG
25 CAPSULE ORAL
Status: DISCONTINUED | OUTPATIENT
Start: 2021-12-20 | End: 2021-12-20 | Stop reason: HOSPADM

## 2021-12-20 RX ORDER — ACETAMINOPHEN 500 MG
500 TABLET ORAL ONCE AS NEEDED
Status: COMPLETED | OUTPATIENT
Start: 2021-12-20 | End: 2021-12-20

## 2021-12-20 RX ORDER — SODIUM CHLORIDE, SODIUM LACTATE, POTASSIUM CHLORIDE, CALCIUM CHLORIDE 600; 310; 30; 20 MG/100ML; MG/100ML; MG/100ML; MG/100ML
9 INJECTION, SOLUTION INTRAVENOUS CONTINUOUS
Status: DISCONTINUED | OUTPATIENT
Start: 2021-12-20 | End: 2021-12-20 | Stop reason: HOSPADM

## 2021-12-20 RX ORDER — PROMETHAZINE HYDROCHLORIDE 25 MG/1
25 SUPPOSITORY RECTAL ONCE AS NEEDED
Status: DISCONTINUED | OUTPATIENT
Start: 2021-12-20 | End: 2021-12-20 | Stop reason: HOSPADM

## 2021-12-20 RX ORDER — LIDOCAINE HYDROCHLORIDE 10 MG/ML
0.5 INJECTION, SOLUTION EPIDURAL; INFILTRATION; INTRACAUDAL; PERINEURAL ONCE AS NEEDED
Status: DISCONTINUED | OUTPATIENT
Start: 2021-12-20 | End: 2021-12-20 | Stop reason: HOSPADM

## 2021-12-20 RX ORDER — CEFAZOLIN SODIUM 2 G/100ML
INJECTION, SOLUTION INTRAVENOUS AS NEEDED
Status: DISCONTINUED | OUTPATIENT
Start: 2021-12-20 | End: 2021-12-20 | Stop reason: SURG

## 2021-12-20 RX ORDER — EPHEDRINE SULFATE 50 MG/ML
5 INJECTION, SOLUTION INTRAVENOUS ONCE AS NEEDED
Status: DISCONTINUED | OUTPATIENT
Start: 2021-12-20 | End: 2021-12-20 | Stop reason: HOSPADM

## 2021-12-20 RX ORDER — NALOXONE HCL 0.4 MG/ML
0.2 VIAL (ML) INJECTION AS NEEDED
Status: DISCONTINUED | OUTPATIENT
Start: 2021-12-20 | End: 2021-12-20 | Stop reason: HOSPADM

## 2021-12-20 RX ADMIN — FENTANYL CITRATE 50 MCG: 0.05 INJECTION, SOLUTION INTRAMUSCULAR; INTRAVENOUS at 15:33

## 2021-12-20 RX ADMIN — Medication 3 ML: at 12:44

## 2021-12-20 RX ADMIN — CEFAZOLIN SODIUM 2 G: 2 INJECTION, SOLUTION INTRAVENOUS at 15:39

## 2021-12-20 RX ADMIN — DIAZEPAM 10 MG: 5 TABLET ORAL at 11:17

## 2021-12-20 RX ADMIN — FAMOTIDINE 20 MG: 10 INJECTION INTRAVENOUS at 11:17

## 2021-12-20 RX ADMIN — FENTANYL CITRATE 50 MCG: 0.05 INJECTION, SOLUTION INTRAMUSCULAR; INTRAVENOUS at 15:49

## 2021-12-20 RX ADMIN — ONDANSETRON 4 MG: 2 INJECTION INTRAMUSCULAR; INTRAVENOUS at 16:05

## 2021-12-20 RX ADMIN — SODIUM CHLORIDE, POTASSIUM CHLORIDE, SODIUM LACTATE AND CALCIUM CHLORIDE 9 ML/HR: 600; 310; 30; 20 INJECTION, SOLUTION INTRAVENOUS at 11:17

## 2021-12-20 RX ADMIN — ACETAMINOPHEN 500 MG: 500 TABLET, FILM COATED ORAL at 16:42

## 2021-12-20 RX ADMIN — LIDOCAINE HYDROCHLORIDE 80 MG: 20 INJECTION, SOLUTION INFILTRATION; PERINEURAL at 15:35

## 2021-12-20 RX ADMIN — PROPOFOL 200 MG: 10 INJECTION, EMULSION INTRAVENOUS at 15:35

## 2021-12-20 NOTE — ANESTHESIA POSTPROCEDURE EVALUATION
Patient: Mitra Celis    Procedure Summary     Date: 12/20/21 Room / Location: [facility]  / [facility]    Anesthesia Start: 1529 Anesthesia Stop: 1628    Procedure: Right breast needle-localized excisional biopsy (Right Breast) Diagnosis:       Atypical lobular hyperplasia (ALH) of right breast      (Atypical lobular hyperplasia (ALH) of right breast [N60.91])    Surgeons: Kathy Costa MD Provider: Enrico Roberts MD    Anesthesia Type: general ASA Status: 3          Anesthesia Type: general    Vitals  Vitals Value Taken Time   /88 12/20/21 1646   Temp 36.4 °C (97.6 °F) 12/20/21 1640   Pulse 80 12/20/21 1647   Resp 14 12/20/21 1640   SpO2 97 % 12/20/21 1647   Vitals shown include unvalidated device data.        Post Anesthesia Care and Evaluation    Patient location during evaluation: bedside  Patient participation: complete - patient participated  Level of consciousness: awake and alert  Pain management: adequate  Airway patency: patent  Anesthetic complications: No anesthetic complications  PONV Status: controlled  Cardiovascular status: acceptable  Respiratory status: acceptable  Hydration status: acceptable

## 2021-12-20 NOTE — OP NOTE
Operative Report    Patient Name:  Mitra Celis  YOB: 1970    Date of Surgery:  12/20/2021    Pre-op Diagnosis:   Atypical lobular hyperplasia (ALH) of right breast [N60.91]       Post-Op Diagnosis:  Atypical lobular hyperplasia (ALH) of right breast [N60.91]    Procedure:  Right breast needle-localized excisional biopsy      Staff:  Surgeon(s):  Kathy Costa MD      Anesthesia: General    Estimated Blood Loss: 5 mL    Implants:    Nothing was implanted during the procedure    Specimen:          Specimens     ID Source Type Tests Collected By Collected At Frozen?    A Breast, Right Tissue · TISSUE PATHOLOGY EXAM   Kathy Costa MD 12/20/21 1558 No    Description: right breast excision short stitch superior, long stitch lateral fresh for permanent            Findings: Wire and clip in good position in specimen radiograph.    Complications: None     Indications: The patient is a 51 y.o. F with a new diagnosis of right breast atypical lobular hyperplasia. She also has an increased lifetime risk of breast cancer (37%, TCv8, calculated 11/24/21). She presents today for excisional biopsy. This required preoperative wire-localization. The risks and benefits of surgery were discussed preoperatively and she understood and agreed to proceed.     Description of Procedure:   Preoperatively, the patient was taken to the radiology department and the lesion was localized with a needle/wire. I reviewed the post-localization mammogram to determine the trajectory of the wire. The patient was identified in the preoperative area and brought to the operating room and placed supine on the table. Patient verification was performed and general anesthesia was induced. The patient was prepped and draped in sterile fashion with the wire/needle prepped into the field. A time out was performed and all were in agreement. I confirmed that the patient had received preoperative antibiotics.      On the skin, I  marked the suggested location of the lesion based on the mammographic localization imaging. The skin was infiltrated with local anesthesia. A medial periareolar incision was made with a scalpel and carried down through the dermis with sharp dissection. Flaps were raised according to the planned dissection. An anterior flap was raised first. Dissection was then carried out superiorly, inferiorly, medially, and laterally. The wire was delivered into the wound. The posterior dissection was completed and the specimen removed. The specimen was oriented with a short stitch superiorly and a long stitch laterally. Specimen radiograph showed the wire and clip in good position.    The breast cavity was irrigated and hemostasis achieved. The remaining local anesthesia was infiltrated into the breast cavity. The breast parenchyma was brought together with a single 3-0 vicryl stitch. The deep dermis was closed with interrupted 3-0 vicryl stitches. The skin was closed with 4-0 subcuticular running monocryl and covered with dermabond. Counts were correct at the end of the case. The patient was awakened from anesthesia and taken to the PACU in stable condition.    Kathy Costa MD     Date: 12/20/2021  Time: 16:36 EST

## 2021-12-20 NOTE — ANESTHESIA PROCEDURE NOTES
Airway  Urgency: elective    Date/Time: 12/20/2021 3:36 PM    General Information and Staff    Patient location during procedure: OR  Anesthesiologist: Enrico Roberts MD    Indications and Patient Condition    Preoxygenated: yes      Final Airway Details  Final airway type: supraglottic airway      Successful airway: classic  Size 4    Number of attempts at approach: 1

## 2021-12-20 NOTE — ANESTHESIA PREPROCEDURE EVALUATION
Anesthesia Evaluation     Patient summary reviewed and Nursing notes reviewed                Airway   Mallampati: II  TM distance: >3 FB  Neck ROM: full  Dental      Pulmonary - negative pulmonary ROS   Cardiovascular - negative cardio ROS    ECG reviewed  Rhythm: regular  Rate: normal        Neuro/Psych  (+) headaches, psychiatric history Anxiety and Depression,     GI/Hepatic/Renal/Endo    (+) obesity,  GERD,      Musculoskeletal (-) negative ROS    Abdominal    Substance History - negative use     OB/GYN negative ob/gyn ROS         Other                        Anesthesia Plan    ASA 3     general   (BMI    I have reviewed the patient's history with the patient and the chart, including all pertinent laboratory results and imaging. I have explained the risks of anesthesia including but not limited to dental damage, corneal abrasion, nerve injury, MI, stroke, and death. Questions asked and answered. Anesthetic plan discussed with patient and team as indicated. Patient expressed understanding of the above.  )  intravenous induction     Anesthetic plan, all risks, benefits, and alternatives have been provided, discussed and informed consent has been obtained with: patient and spouse/significant other.

## 2021-12-24 ENCOUNTER — TELEPHONE (OUTPATIENT)
Dept: SURGERY | Facility: CLINIC | Age: 51
End: 2021-12-24

## 2021-12-24 NOTE — TELEPHONE ENCOUNTER
I called Ms. Celis to discuss her pathology report. She is recovering well from surgery. I will see her at her scheduled follow-up appointment.     Kathy Costa MD

## 2022-01-06 ENCOUNTER — OFFICE VISIT (OUTPATIENT)
Dept: SURGERY | Facility: CLINIC | Age: 52
End: 2022-01-06

## 2022-01-06 VITALS
DIASTOLIC BLOOD PRESSURE: 91 MMHG | SYSTOLIC BLOOD PRESSURE: 153 MMHG | BODY MASS INDEX: 29.45 KG/M2 | WEIGHT: 156 LBS | HEIGHT: 61 IN | HEART RATE: 86 BPM | OXYGEN SATURATION: 98 %

## 2022-01-06 DIAGNOSIS — Z48.89 POSTOPERATIVE VISIT: Primary | ICD-10-CM

## 2022-01-06 DIAGNOSIS — Z12.31 ENCOUNTER FOR SCREENING MAMMOGRAM FOR MALIGNANT NEOPLASM OF BREAST: ICD-10-CM

## 2022-01-06 DIAGNOSIS — N60.91 ATYPICAL LOBULAR HYPERPLASIA (ALH) OF RIGHT BREAST: ICD-10-CM

## 2022-01-06 DIAGNOSIS — Z91.89 INCREASED RISK OF BREAST CANCER: ICD-10-CM

## 2022-01-06 PROCEDURE — 99024 POSTOP FOLLOW-UP VISIT: CPT | Performed by: SURGERY

## 2022-01-06 NOTE — PROGRESS NOTES
[facility]     Referring Provider: Garrick Oneal MD     Chief complaint: Postoperative visit     HPI:   11/24/21: Ms. Mitra Celis is a 52 yo woman, seen at the request of Dr. Garrick Oneal, for a new diagnosis of right breast atypical lobular hyperplasia (ALH). The patient has been in high risk screening for breast cancer for the past few years. On recent routine screening MRI, there was a new area of right breast enhancement. Subsequent biopsy showed focal ALH. Her work-up is detailed in the breast history section below. Prior to the biopsy she denies any breast lumps, pain, skin changes, or nipple discharge. She denies any prior history of abnormal mammograms or breast biopsies.     She has a family history of breast cancer in her mother (diagnosed at age 60 and at 81 with a second primary cancer in the contralateral breast), her maternal grandmother (diagnosed in her 60s) and maternal great grandmother (unknown age of diagnosis). She denies any family history of ovarian cancer. Both the patient and her mother had negative expanded panel genetic testing.     01/06/22, Interval History:  She underwent a right breast excisional biospy on 12/20/21. See surgery & pathology details in breast history section below. She has been recovering well and has no complaints. She was joined today in clinic by her . She gave consent for him to be present during her examination and participate in the discussion.       Breast history/imaging (updated 01/06/22):    06/12/2018, [facility] Common Hereditary Cancers Panel (46 genes): Negative     03/09/2020, Screening MMG with Mandeep ([facility]):  Heterogeneously dense. There are stable areas of asymmetric breast tissue seen in both breasts. No suspicious masses, suspicious microcalcifications or architectural distortions are identified. There has been no significant change from the prior exam(s).  BI-RADS 2: Benign.     09/15/2020, Bilateral Breast MRI ([facility]):  Right  breast: No suspicious enhancing mass or area of nonmass enhancement is identified. The visualized axilla is within normal limits.   Left breast: No suspicious enhancing mass or nonmass enhancement is identified. The visualized axilla is within normal limits.   Extramammary findings: There are abnormally enlarged internal mammary chain lymph nodes on either side.  BI-RADS 1: Negative.     03/16/2021, Screening MMG with Mandeep ([facility]):  Heterogeneously dense. No suspicious masses, significant calcifications or other abnormalities are seen.  BI-RADS 1: Negative.     09/27/2021, Bilateral Breast MRI ( [facility]):  Centered in the posterior one third lower inner quadrant of the right breast at the 3:30 position on the order of 5 cm from the nipple there is linear enhancement that measures on the order of 2.1 cm in the anterior posterior dimension, 0.5 cm in the superior-inferior dimension and 0.4 cm in the medial to lateral dimension. This has developed since the prior examination.   No other areas of abnormal enhancement or morphology are seen in the right breast. I see no evidence for abnormal right breast skin, nipple or chest wall enhancement and there is no evidence for right axillary or internal mammary chain adenopathy.   There are no areas of abnormal enhancement or morphology in the left breast. I see no evidence for abnormal left breast skin, nipple or chest wall enhancement and there is no evidence for left axillary or internal mammary chain adenopathy.  BI-RADS 4: Suspicious.     10/12/2021, Right Breast, MR-Guided Biopsy ( [facility]):  1.  Right Breast, 3:30, MRI-Guided Biopsies for Linear Enhancement:                A.  Benign breast parenchyma with scattered hyalinized stromal fibrosis and focal clustered microcysts.  B.  Focal atypical lobular hyperplasia (ALH).  C.  Microcalcifications associated with benign breast ducts.  D.  No evidence of in situ nor invasive carcinoma identified.    12/20/2021, Right  breast needle-localized excisional biopsy:  1.  Right Breast, Oriented Needle Localization Lumpectomy (5 grams):                A.  Benign breast parenchyma with biopsy site present, stromal fibrosis, fibroadenomatoid         and usual ductal hyperplasia.    B.  No evidence of atypical hyperplasia, in situ nor invasive carcinoma identified (margins clear).       Review of Systems:  See interval history.       Medications:    Current Outpatient Medications:   •  B COMPLEX VITAMINS PO, Take 1 tablet by mouth Daily., Disp: , Rfl:   •  Calcium Carb-Cholecalciferol (CALTRATE 600+D3 PO), Take 1 tablet by mouth Daily. HOLDING FOR OR, Disp: , Rfl:   •  escitalopram (LEXAPRO) 20 MG tablet, Take 20 mg by mouth Daily., Disp: , Rfl:   •  famotidine (PEPCID) 20 MG tablet, Take 40 mg by mouth Daily As Needed for Heartburn., Disp: , Rfl:   •  fluticasone (FLONASE) 50 MCG/ACT nasal spray, 2 sprays into the nostril(s) as directed by provider Daily As Needed for Rhinitis., Disp: , Rfl:   •  Loratadine (CLARITIN PO), Take 1 tablet by mouth., Disp: , Rfl:   •  multivitamin with minerals (MULTIVITAMIN ADULT PO), Take 1 tablet by mouth Daily. HOLDING FOR 7 DAYS PRIOR TO OR, Disp: , Rfl:   •  oxyCODONE (ROXICODONE) 5 MG immediate release tablet, Take 1 tablet by mouth Every 6 (Six) Hours As Needed for Moderate Pain ., Disp: 6 tablet, Rfl: 0  •  PROBIOTIC PRODUCT PO, Take 1 tablet by mouth Daily., Disp: , Rfl:       Allergies   Allergen Reactions   • Aleve [Naproxen Sodium] Anaphylaxis and Other (See Comments)     syncope   • Latex Rash     rash       Family History   Problem Relation Age of Onset   • Breast cancer Mother         Diagnosed at 60 and 80    • Breast cancer Maternal Grandmother         Daignosed in her 60s   • Diabetes Paternal Grandmother    • Cancer Maternal Aunt         Spine Cancer    • Breast cancer Maternal Great-Grandmother    • Malig Hyperthermia Neg Hx        PHYSICAL EXAMINATION:   Vitals:    01/06/22 1053   BP:  153/91   Pulse: 86   SpO2: 98%     ECOG 0 - Asymptomatic  General: NAD, well appearing  Psych: a&o x 3, normal mood and affect  Eyes: EOMI, no scleral icterus  ENMT: neck supple without masses or thyromegaly, mucus membranes moist  MSK: normal gait, normal ROM in bilateral shoulders  Lymph nodes: no cervical, supraclavicular or axillary lymphadenopathy  Breast: symmetric, moderate size, grade 2 ptosis  Right: UIQ horizontal scar from prior mole removal and well-healing medial periareolar incision with Dermabond intact. No concavity or fullness at the surgical site.  Left: deferred.      Assessment:  51 y.o. F sp right breast excisional biospy of atypical lobular hyperplasia on 12/20/21 with benign final pathology. She also has an increased lifetime risk of breast cancer (37%, TCv8, calculated 11/24/21).    Plan:  -She is going to continue with high risk screening and start being seen in our high-risk breast clinic. Follow-up in 3/2022 with mammogram with Dr. Bazan.  -Medical oncology referral to discuss possible chemoprevention.  -F/u with me as necessary. She was instructed to call with any questions, concerns or changes on BSE.    Kathy Costa MD      CC:  MD Candace Knowles MD

## 2022-01-11 ENCOUNTER — TELEPHONE (OUTPATIENT)
Dept: SURGERY | Facility: CLINIC | Age: 52
End: 2022-01-11

## 2022-01-11 ENCOUNTER — TELEPHONE (OUTPATIENT)
Dept: MAMMOGRAPHY | Facility: CLINIC | Age: 52
End: 2022-01-11

## 2022-01-11 NOTE — TELEPHONE ENCOUNTER
MMG is scheduled on 4/26/2022 @ 9:40am @ [facility].    Sent Kobe @ Dr. Bazan's office a message to schedule patient for f/u.    Called and spoke to patient, patient expressed v/u of appointment time.

## 2022-01-11 NOTE — TELEPHONE ENCOUNTER
Called and LM for patient to contact [facility] to schedule with Dr. Bazan after imaging on 04/26/2022. P:[phone], This is our first attempt. MB       ----- Message  sent at 1/11/2022 12:39 PM EST -----  Regarding: appointment  Can you please schedule this patient for an appointment with Dr. Bazan?  Her imaging is scheduled on 4/26/2022, so after that, thanks!

## 2022-01-12 ENCOUNTER — TELEPHONE (OUTPATIENT)
Dept: MAMMOGRAPHY | Facility: CLINIC | Age: 52
End: 2022-01-12

## 2022-01-12 NOTE — TELEPHONE ENCOUNTER
"/58 (BP Location: Right arm)   Pulse 65   Temp 95.5  F (35.3  C) (Oral)   Resp 16   Ht 1.676 m (5' 6\")   Wt 76.7 kg (169 lb)   SpO2 95%   BMI 27.28 kg/m      POD 2 s/p laparoscopic extended right hemicolectomy. Pt is A&Ox4 and able to make needs known. VSS. Pain is managed with oxycodone 5-10 mg Q3h PRN and tylenol scheduled. RPIV is SL. 3 lap sites on left side of abdomen and midline, covered with derma bond and AYLIN. Watery BM at 0900. Nausea noted and given PRN compazine. Abdominal binder while up. Ambulating in falls with SBA. Tolerating low fiber diet. Continue with current plan on care.  " Called and scheduled pt for 05/04/2022 at 1PM. Mailed NP paperwork and reminder, verified address. MB

## 2022-01-17 ENCOUNTER — LAB (OUTPATIENT)
Dept: LAB | Facility: HOSPITAL | Age: 52
End: 2022-01-17

## 2022-01-17 ENCOUNTER — CONSULT (OUTPATIENT)
Dept: ONCOLOGY | Facility: CLINIC | Age: 52
End: 2022-01-17

## 2022-01-17 VITALS
TEMPERATURE: 97.1 F | WEIGHT: 158.2 LBS | HEIGHT: 61 IN | SYSTOLIC BLOOD PRESSURE: 141 MMHG | BODY MASS INDEX: 29.87 KG/M2 | HEART RATE: 75 BPM | DIASTOLIC BLOOD PRESSURE: 92 MMHG | RESPIRATION RATE: 18 BRPM | OXYGEN SATURATION: 100 %

## 2022-01-17 DIAGNOSIS — N60.91 ATYPICAL LOBULAR HYPERPLASIA (ALH) OF RIGHT BREAST: Primary | ICD-10-CM

## 2022-01-17 DIAGNOSIS — N60.91 ATYPICAL LOBULAR HYPERPLASIA (ALH) OF RIGHT BREAST: ICD-10-CM

## 2022-01-17 DIAGNOSIS — E87.5 HYPERKALEMIA: ICD-10-CM

## 2022-01-17 LAB
BASOPHILS # BLD AUTO: 0.04 10*3/MM3 (ref 0–0.2)
BASOPHILS NFR BLD AUTO: 0.7 % (ref 0–1.5)
DEPRECATED RDW RBC AUTO: 42.4 FL (ref 37–54)
EOSINOPHIL # BLD AUTO: 0.18 10*3/MM3 (ref 0–0.4)
EOSINOPHIL NFR BLD AUTO: 3 % (ref 0.3–6.2)
ERYTHROCYTE [DISTWIDTH] IN BLOOD BY AUTOMATED COUNT: 12.4 % (ref 12.3–15.4)
ESTRADIOL SERPL HS-MCNC: 21.5 PG/ML
FSH SERPL-ACNC: 98.3 MIU/ML
HCT VFR BLD AUTO: 48.9 % (ref 34–46.6)
HGB BLD-MCNC: 16.2 G/DL (ref 12–15.9)
IMM GRANULOCYTES # BLD AUTO: 0.01 10*3/MM3 (ref 0–0.05)
IMM GRANULOCYTES NFR BLD AUTO: 0.2 % (ref 0–0.5)
LH SERPL-ACNC: 50.4 MIU/ML
LYMPHOCYTES # BLD AUTO: 2.12 10*3/MM3 (ref 0.7–3.1)
LYMPHOCYTES NFR BLD AUTO: 35.5 % (ref 19.6–45.3)
MCH RBC QN AUTO: 30.6 PG (ref 26.6–33)
MCHC RBC AUTO-ENTMCNC: 33.1 G/DL (ref 31.5–35.7)
MCV RBC AUTO: 92.3 FL (ref 79–97)
MONOCYTES # BLD AUTO: 0.28 10*3/MM3 (ref 0.1–0.9)
MONOCYTES NFR BLD AUTO: 4.7 % (ref 5–12)
NEUTROPHILS NFR BLD AUTO: 3.35 10*3/MM3 (ref 1.7–7)
NEUTROPHILS NFR BLD AUTO: 55.9 % (ref 42.7–76)
NRBC BLD AUTO-RTO: 0 /100 WBC (ref 0–0.2)
PLATELET # BLD AUTO: 231 10*3/MM3 (ref 140–450)
PMV BLD AUTO: 10 FL (ref 6–12)
RBC # BLD AUTO: 5.3 10*6/MM3 (ref 3.77–5.28)
WBC NRBC COR # BLD: 5.98 10*3/MM3 (ref 3.4–10.8)

## 2022-01-17 PROCEDURE — 85025 COMPLETE CBC W/AUTO DIFF WBC: CPT

## 2022-01-17 PROCEDURE — 83002 ASSAY OF GONADOTROPIN (LH): CPT | Performed by: INTERNAL MEDICINE

## 2022-01-17 PROCEDURE — 99205 OFFICE O/P NEW HI 60 MIN: CPT | Performed by: INTERNAL MEDICINE

## 2022-01-17 PROCEDURE — 82670 ASSAY OF TOTAL ESTRADIOL: CPT | Performed by: INTERNAL MEDICINE

## 2022-01-17 PROCEDURE — 36415 COLL VENOUS BLD VENIPUNCTURE: CPT

## 2022-01-17 PROCEDURE — 83001 ASSAY OF GONADOTROPIN (FSH): CPT | Performed by: INTERNAL MEDICINE

## 2022-01-17 RX ORDER — TAMOXIFEN CITRATE 10 MG/1
10 TABLET ORAL EVERY OTHER DAY
Qty: 45 TABLET | Refills: 3 | Status: SHIPPED | OUTPATIENT
Start: 2022-01-17 | End: 2022-12-19

## 2022-01-17 NOTE — PROGRESS NOTES
Subjective   Mitra Celis is a 51 y.o. female.  Referred by Dr. Costa for discussing risk reduction.    History of Present Illness   Ms. Olivarez is a 51-year-old postmenopausal  lady with a strong family history of breast cancer including her mother being diagnosed with breast cancer at the age of 59, 60 and a rsecond breast cancer at the age of 81.  Her maternal grandmother had breast cancer in his 60s and maternal great grandmother with history of breast cancer.  Given her family history she has been on high risk screening including annual mammograms and MRIs.  She underwent genetic testing with [employer] Common herditary cancers panel on 6/13/2018 and was negative.    3/16/2021-bilateral screening mammogram-benign.    9/27/2021-bilateral breast MRI-linear enhancement measuring 2.1 x 0.5 x 0.4 cm in the right breast at the 3:30 position, 5 cm from the nipple.  MR guided biopsy recommended.  No abnormalities of the left breast.    10/12/2021-MRI biopsy of the right breast abnormality.  Pathology consistent with focal atypical lobular hyperplasia.  Benign breast parenchyma with scattered hyalinized stromal fibrosis and focal clustered microcysts.  Microcalcifications associated benign breast ducts.    She was evaluated by Dr. Costa and since there was an abnormal enhancement on the MRI in the area of ALH it was decided to proceed with lumpectomy.    12/20/2021-right breast needle localized lumpectomy.  Benign breast parenchyma with biopsy site changes, stromal fibrosis and fibroadenomatoid and usual ductal hyperplasia.  No evidence of atypical hyperplasia, in situ or invasive carcinoma.    She has been referred to medical oncology for discussing risk reduction given strong family history and focal atypical lobular hyperplasia.    Tytrina Meyer risk estimate has been calculated and lifetime risk of breast cancer being 37%      The following portions of the patient's history were reviewed and updated as  appropriate: allergies, current medications, past family history, past medical history, past social history, past surgical history and problem list.    Past Medical History:   Diagnosis Date   • Allergic rhinitis    • Anxiety and depression    • COVID 2020   • GERD (gastroesophageal reflux disease)    • Headache    • Migraines         Past Surgical History:   Procedure Laterality Date   • BREAST BIOPSY     • BREAST BIOPSY Right 2021    Procedure: Right breast needle-localized excisional biopsy;  Surgeon: Kathy Costa MD;  Location: [facility];  Service: General;  Laterality: Right;   • DILATATION AND CURETTAGE     • ENDOMETRIAL CRYOABLATION     • EYE SURGERY      LASIK   • MOLE REMOVAL     • SINUS SURGERY     • SKIN BIOPSY          Family History   Problem Relation Age of Onset   • Breast cancer Mother         Diagnosed at 60 and 80    • Breast cancer Maternal Grandmother         Daignosed in her 60s   • Diabetes Paternal Grandmother    • Cancer Maternal Aunt         Spine Cancer    • Breast cancer Maternal Great-Grandmother    • Malig Hyperthermia Neg Hx         Social History     Socioeconomic History   • Marital status:    • Number of children: 0   Tobacco Use   • Smoking status: Never Smoker   • Smokeless tobacco: Never Used   Vaping Use   • Vaping Use: Never used   Substance and Sexual Activity   • Alcohol use: Yes     Comment: SOCIAL (Wine/[place])   • Drug use: Never   • Sexual activity: Defer        OB History             Para        Term   0            AB        Living           SAB        IAB        Ectopic        Molar        Multiple        Live Births                Age at menarche 10  Age at first childbirth-not applicable   1 para 0  1  Age at menopause-47  Oral contraceptive pill use for 20 years  Hormone replacement therapy-none    Allergies   Allergen Reactions   • Aleve [Naproxen Sodium] Anaphylaxis and Other (See Comments)      "syncope   • Latex Rash     rash            Review of Systems   Constitutional: Negative.    HENT: Negative.    Eyes: Negative.    Respiratory: Negative.    Cardiovascular: Negative.    Gastrointestinal: Negative.    Endocrine: Negative.    Genitourinary: Negative.  Positive for amenorrhea.   Musculoskeletal: Negative.    Skin: Negative.    Allergic/Immunologic: Negative.    Neurological: Negative.    Hematological: Negative.    Psychiatric/Behavioral: Negative.          Objective   Resp. rate 18, height 154.9 cm (60.98\").   Physical Exam  Vitals reviewed.   Constitutional:       Appearance: She is normal weight.   HENT:      Head: Normocephalic.      Right Ear: External ear normal.      Left Ear: External ear normal.      Nose: Nose normal.      Mouth/Throat:      Mouth: Mucous membranes are moist.   Eyes:      Extraocular Movements: Extraocular movements intact.      Pupils: Pupils are equal, round, and reactive to light.   Cardiovascular:      Rate and Rhythm: Normal rate and regular rhythm.      Pulses: Normal pulses.   Pulmonary:      Effort: Pulmonary effort is normal.   Abdominal:      General: Abdomen is flat. Bowel sounds are normal.   Musculoskeletal:         General: Normal range of motion.      Cervical back: Normal range of motion.   Skin:     General: Skin is warm.   Neurological:      General: No focal deficit present.      Mental Status: She is alert and oriented to person, place, and time. Mental status is at baseline.   Psychiatric:         Mood and Affect: Mood normal.         Behavior: Behavior normal.         Thought Content: Thought content normal.         Judgment: Judgment normal.       Breast Exam: Right breast status post lumpectomy with periareolar incision.  Incision healing well.  There is a seroma adjacent to the scar.  No other abnormalities.  Left breast appears normal .    Lab on 01/17/2022   Component Date Value Ref Range Status   • WBC 01/17/2022 5.98  3.40 - 10.80 10*3/mm3 Final "   • RBC 01/17/2022 5.30* 3.77 - 5.28 10*6/mm3 Final   • Hemoglobin 01/17/2022 16.2* 12.0 - 15.9 g/dL Final   • Hematocrit 01/17/2022 48.9* 34.0 - 46.6 % Final   • MCV 01/17/2022 92.3  79.0 - 97.0 fL Final   • MCH 01/17/2022 30.6  26.6 - 33.0 pg Final   • MCHC 01/17/2022 33.1  31.5 - 35.7 g/dL Final   • RDW 01/17/2022 12.4  12.3 - 15.4 % Final   • RDW-SD 01/17/2022 42.4  37.0 - 54.0 fl Final   • MPV 01/17/2022 10.0  6.0 - 12.0 fL Final   • Platelets 01/17/2022 231  140 - 450 10*3/mm3 Final   • Neutrophil % 01/17/2022 55.9  42.7 - 76.0 % Final   • Lymphocyte % 01/17/2022 35.5  19.6 - 45.3 % Final   • Monocyte % 01/17/2022 4.7* 5.0 - 12.0 % Final   • Eosinophil % 01/17/2022 3.0  0.3 - 6.2 % Final   • Basophil % 01/17/2022 0.7  0.0 - 1.5 % Final   • Immature Grans % 01/17/2022 0.2  0.0 - 0.5 % Final   • Neutrophils, Absolute 01/17/2022 3.35  1.70 - 7.00 10*3/mm3 Final   • Lymphocytes, Absolute 01/17/2022 2.12  0.70 - 3.10 10*3/mm3 Final   • Monocytes, Absolute 01/17/2022 0.28  0.10 - 0.90 10*3/mm3 Final   • Eosinophils, Absolute 01/17/2022 0.18  0.00 - 0.40 10*3/mm3 Final   • Basophils, Absolute 01/17/2022 0.04  0.00 - 0.20 10*3/mm3 Final   • Immature Grans, Absolute 01/17/2022 0.01  0.00 - 0.05 10*3/mm3 Final   • nRBC 01/17/2022 0.0  0.0 - 0.2 /100 WBC Final   Admission on 12/20/2021, Discharged on 12/20/2021   Component Date Value Ref Range Status   • HCG, Urine, QL 12/20/2021 Negative  Negative Final   • Lot Number 12/20/2021 [id number]   Final   • Internal Positive Control 12/20/2021 Passed  Positive, Passed Final   • Internal Negative Control 12/20/2021 Passed  Negative, Passed Final   • Expiration Date 12/20/2021 04/30/2022   Final   • Case Report 12/20/2021    Final                    Value:Surgical Pathology Report                         Case: [id number]                                  Authorizing Provider:  Kathy Costa MD    Collected:           12/20/2021 03:58 PM          Ordering Location:      [facility]  Received:            12/20/2021 04:09 PM                                 MAIN OR                                                                      Pathologist:           Essence Lindo MD                                                    Specimen:    Breast, Right, right breast excision short stitch superior, long stitch lateral fresh               for permanent                                                                             • Final Diagnosis 12/20/2021    Final                    Value:This result contains rich text formatting which cannot be displayed here.   • Comment 12/20/2021    Final                    Value:This result contains rich text formatting which cannot be displayed here.   • Gross Description 12/20/2021    Final                    Value:This result contains rich text formatting which cannot be displayed here.   • Special Stains 12/20/2021    Final                    Value:This result contains rich text formatting which cannot be displayed here.        Mammo Breast Placement Device Initial Without Biopsy    Result Date: 12/20/2021  Technically successful mammographic guided right breast needle localization.  This report was finalized on 12/20/2021 4:13 PM by Dr. Mayito Roldan M.D.      XR Breast Specimen    Result Date: 12/20/2021  Technically successful mammographic guided right breast needle localization.  This report was finalized on 12/20/2021 4:13 PM by Dr. Mayito Roldan M.D.       1/17/20222389-BDA-FXN 5.98, hemoglobin 16.2, platelets 231        Assessment/Plan        *Focal atypical lobular hyperplasia  · Status post excision of the abnormal area of enhancement noted on the MRI.  No residual ALH seen.  · Given that she only had focal ALH risk of breast cancer in the next 5 years is 5%, 10 years is 12% and lifetime is 24%.  · However she also has a family history of breast cancer.  · Due to this the Tyrer-zi lifetime risk of breast cancer  is around 37%.  · Explained to her about the elevated risk of breast cancer.  · Discussed risk reduction with tamoxifen versus AI's.  · Adverse effects of both medications discussed at length including but not limited to hot flashes, mood changes, fatigue, sexual dysfunction, hypertension, risk of DVT PE, risk of cataracts, risk of uterine cancer associated tamoxifen.  Adverse effects of AI including but not limited to hot flashes, mood changes, fatigue, alopecia, vaginal dryness, sexual dysfunction, decrease in bone mineral density, arthralgias and myalgias discussed.  · She would prefer to start tamoxifen.  · Tamoxifen would be prescribed.  · She will continue with annual mammogram and MRI.  · Discussed healthy lifestyle including regular exercise and diet.    *Family history of breast cancer  · Start tamoxifen for risk reduction  · Genetic testing performed and patient as well as her mother was negative.    *Hypertension-likely secondary to anxiety.  Recheck blood pressure at home    60-minute spent on the encounter including face-to-face time and documentation on the same day.

## 2022-01-18 ENCOUNTER — LAB (OUTPATIENT)
Dept: LAB | Facility: HOSPITAL | Age: 52
End: 2022-01-18

## 2022-01-18 ENCOUNTER — TELEPHONE (OUTPATIENT)
Dept: ONCOLOGY | Facility: CLINIC | Age: 52
End: 2022-01-18

## 2022-01-18 DIAGNOSIS — E87.5 HYPERKALEMIA: Primary | ICD-10-CM

## 2022-01-18 DIAGNOSIS — E87.5 HYPERKALEMIA: ICD-10-CM

## 2022-01-18 LAB
ANION GAP SERPL CALCULATED.3IONS-SCNC: 10.7 MMOL/L (ref 5–15)
BUN SERPL-MCNC: 14 MG/DL (ref 6–20)
BUN/CREAT SERPL: 18.9 (ref 7.3–30)
CALCIUM SPEC-SCNC: 10.1 MG/DL (ref 8.5–10.2)
CHLORIDE SERPL-SCNC: 101 MMOL/L (ref 98–107)
CO2 SERPL-SCNC: 28.3 MMOL/L (ref 22–29)
CREAT SERPL-MCNC: 0.74 MG/DL (ref 0.6–1.1)
GFR SERPL CREATININE-BSD FRML MDRD: 82 ML/MIN/1.73
GLUCOSE SERPL-MCNC: 105 MG/DL (ref 74–124)
POTASSIUM SERPL-SCNC: 5.7 MMOL/L (ref 3.5–4.7)
SODIUM SERPL-SCNC: 140 MMOL/L (ref 134–145)

## 2022-01-18 PROCEDURE — 80048 BASIC METABOLIC PNL TOTAL CA: CPT

## 2022-01-18 NOTE — TELEPHONE ENCOUNTER
Notified pt by phone of postmenopausal status.  Also informed her that her potassium was elevated again today.  She denies any potassium supplements.  Advised her Dr Santiago would like for her to see a kidney specialist for further workup of this as her previously drawn values were within the normal range.  She v/u. Referral entered and notified scheduling.

## 2022-01-20 ENCOUNTER — TELEPHONE (OUTPATIENT)
Dept: ONCOLOGY | Facility: CLINIC | Age: 52
End: 2022-01-20

## 2022-01-20 NOTE — TELEPHONE ENCOUNTER
----- Message from Mari Dugan sent at 1/20/2022  7:54 AM EST -----  I already filled out a referral and sent medical records a message to fax records I just need you to call and schedule the pt because they want her seen Asap? Raúl Culver   ----- Message -----  From: Kathy Bernard RN  Sent: 1/18/2022   2:03 PM EST  [facility]    I've entered a referral to nephrology Dr Donohue, per Dr Santiago, ok for anyone in that group to see her, just next available asap.    Thank you!

## 2022-01-20 NOTE — TELEPHONE ENCOUNTER
----- Message from Kathy Bernard RN sent at 1/18/2022  2:02 PM EST -----  I've entered a referral to nephrology Dr Donohue, per Dr Santiago, ok for anyone in that group to see her, just next available asap.    Thank you!

## 2022-04-26 ENCOUNTER — APPOINTMENT (OUTPATIENT)
Dept: WOMENS IMAGING | Facility: HOSPITAL | Age: 52
End: 2022-04-26

## 2022-04-26 PROCEDURE — 77063 BREAST TOMOSYNTHESIS BI: CPT | Performed by: RADIOLOGY

## 2022-04-26 PROCEDURE — 77067 SCR MAMMO BI INCL CAD: CPT | Performed by: RADIOLOGY

## 2022-05-04 ENCOUNTER — PATIENT ROUNDING (BHMG ONLY) (OUTPATIENT)
Dept: MAMMOGRAPHY | Facility: CLINIC | Age: 52
End: 2022-05-04

## 2022-05-04 ENCOUNTER — OFFICE VISIT (OUTPATIENT)
Dept: MAMMOGRAPHY | Facility: CLINIC | Age: 52
End: 2022-05-04

## 2022-05-04 VITALS
BODY MASS INDEX: 29.83 KG/M2 | DIASTOLIC BLOOD PRESSURE: 78 MMHG | WEIGHT: 158 LBS | HEIGHT: 61 IN | OXYGEN SATURATION: 99 % | HEART RATE: 99 BPM | SYSTOLIC BLOOD PRESSURE: 124 MMHG

## 2022-05-04 DIAGNOSIS — Z91.89 INCREASED RISK OF BREAST CANCER: Primary | ICD-10-CM

## 2022-05-04 DIAGNOSIS — N60.91 ATYPICAL LOBULAR HYPERPLASIA (ALH) OF RIGHT BREAST: ICD-10-CM

## 2022-05-04 PROCEDURE — 99214 OFFICE O/P EST MOD 30 MIN: CPT | Performed by: SURGERY

## 2022-05-04 NOTE — PROGRESS NOTES
Chief Complaint: Mitra Celis is a 52 y.o.. female here today for Abnormal Breast Imaging        History of Present Illness:  Patient presents with an increased risk for breast cancer.  This increased risk was noted years ago based on the patient's family history.  Her mother had breast cancer twice as did her mother.  The patient has been genetically tested and is negative.  She was undergoing alternating mammograms and MRIs for screening purposes.  In September of last year the MRI showed an area of abnormal enhancement with no mammographic correlate.  A needle biopsy was performed and revealed some fibrocystic change along with a focus of atypical lobular hyperplasia.  The area of enhancement had measured about 2 cm and therefore an excisional biopsy was performed to rule out any.  Anesthesia.  There was no further atypia noted and no cancer.  The patient just recently had mammograms and I have reviewed those studies.  She has scattered fibroglandular densities and I do not see any clips in her breast.  There are no areas of architectural distortion, suspicious microcalcifications, or masses.    The patient has seen the medical oncologist regarding medical risk reduction and is currently on tamoxifen which she seems to be tolerating well.      Review of Systems:  Review of Systems - Oncology   I have reviewed the ROS as documented by the MA/LPN/RN Kira Blake MA      Past Medical and Surgical History:  Breast Biopsy History:  Patient has had the following breast biopsies:2021 ADH  Breast Cancer HIstory:  Patient does not have a past medical history of breast cancer.  Breast Operations, and year:  2021 Dr. Costa performed lumpectomy   Social History     Tobacco Use   Smoking Status Never Smoker   Smokeless Tobacco Never Used     Obstetric History:  Patient does not menstruate, due to an ablation in the following year:2016   Number of pregnancies:1  Number of live births: 0  Number of abortions or  miscarriages: 1  Age of delivery of first child: n/a  Patient did not breast feed.  Length of time taking birth control pills:20 years  Patient has never taken hormone replacement    Past Surgical History:   Procedure Laterality Date   • BREAST BIOPSY     • BREAST BIOPSY Right 12/20/2021    Procedure: Right breast needle-localized excisional biopsy;  Surgeon: Kathy Costa MD;  Location: [facility];  Service: General;  Laterality: Right;   • DILATATION AND CURETTAGE     • ENDOMETRIAL CRYOABLATION  1998   • EYE SURGERY      LASIK   • MOLE REMOVAL     • SINUS SURGERY  2012   • SKIN BIOPSY         Past Medical History:   Diagnosis Date   • Allergic rhinitis    • Anxiety and depression    • COVID 11/2020   • GERD (gastroesophageal reflux disease)    • Headache    • Migraines        Prior Hospitalizations, other than for surgery or childbirth, and year:  0    Social History:  Patient is .  Patient has no children.    Family History:  Family History   Problem Relation Age of Onset   • Breast cancer Mother         Diagnosed at 60 and 80    • Breast cancer Maternal Grandmother         Daignosed in her 60s   • Diabetes Paternal Grandmother    • Cancer Maternal Aunt         Spine Cancer    • Breast cancer Maternal Great-Grandmother    • Malig Hyperthermia Neg Hx        Vital Signs:  Vitals:    05/04/22 1318   BP: 124/78   Pulse: 99   SpO2: 99%       Medications:    Current Outpatient Prescriptions:     Current Outpatient Medications:   •  B COMPLEX VITAMINS PO, Take 1 tablet by mouth Daily., Disp: , Rfl:   •  Calcium Carb-Cholecalciferol (CALTRATE 600+D3 PO), Take 1 tablet by mouth Daily. HOLDING FOR OR, Disp: , Rfl:   •  escitalopram (LEXAPRO) 20 MG tablet, Take 20 mg by mouth Daily., Disp: , Rfl:   •  famotidine (PEPCID) 20 MG tablet, Take 40 mg by mouth Daily As Needed for Heartburn., Disp: , Rfl:   •  fluticasone (FLONASE) 50 MCG/ACT nasal spray, 2 sprays into the nostril(s) as directed by provider Daily  As Needed for Rhinitis., Disp: , Rfl:   •  Loratadine (CLARITIN PO), Take 1 tablet by mouth., Disp: , Rfl:   •  multivitamin with minerals (MULTIVITAMIN ADULT PO), Take 1 tablet by mouth Daily. HOLDING FOR 7 DAYS PRIOR TO OR, Disp: , Rfl:   •  PROBIOTIC PRODUCT PO, Take 1 tablet by mouth Daily., Disp: , Rfl:   •  tamoxifen (NOLVADEX) 10 MG tablet, Take 1 tablet by mouth Every Other Day., Disp: 45 tablet, Rfl: 3    Physical Examination:  General Appearance:   Patient is in no distress.  She is well kept and has a BMI of 29.9.  Psychiatric:  Patient with appropriate mood and affect. Alert and oriented to self, time, and place.    Breast, RIGHT:  medium sized, symmetric with the contralateral side.  Breast skin is without erythema, edema, rashes.  There is a well-healed scar around the medial edge of the areola.  She also has a small scar high in the upper inner quadrant from removal of a skin lesion.  There are no visible abnormalities upon inspection during the arm-raising maneuver or with hands on hips in the sitting position. There is no nipple retraction, discharge or nipple/areolar skin changes.There are no masses palpable in the sitting or supine positions.    Breast, LEFT:  medium sized, symmetric with the contralateral side.  Breast skin is without erythema, edema, rashes.  There are no visible abnormalities upon inspection during the arm-raising maneuver or with hands on hips in the sitting position. There is no nipple retraction, discharge or nipple/areolar skin changes.There are no masses palpable in the sitting or supine positions.    Lymphatic:  There is no axillary, cervical, infraclavicular, or supraclavicular adenopathy bilaterally.    Gastrointestinal:  Abdomen is soft, nondistended, and nontender.  There was no obvious hepatosplenomegaly or abdominal mass.  There are no scars from previous surgery.    Musculoskeletal:  Good strength in all 4 extremities.   There is good range of motion in both  shoulders.        Assessment:  1. Increased risk of breast cancer    2. Atypical lobular hyperplasia (ALH) of right breast    The patient is quite aware that a person's risk for breast cancer is markedly elevated with associated with a genetic mutation, a strong family history for breast cancer, LCIS, atypical hyperplasia, or radiation to the chest wall under the age of 30.  The patient has been genetically tested and is negative.  Our risk assessment models would calculate her lifetime risk between 31 and 38%.  Because this estimated risk is above 20%, the patient would benefit from alternating mammograms and MRIs which she has been doing.  I would also recommend twice yearly physical examination.  The patient has also been noted to have an elevated 5-year risk at 5.1%.  This would make her a candidate to consider medical risk reduction.  She has already done this and is tolerating tamoxifen.    We also talked about the importance of exercise, weight control, and limiting alcohol intake.      Plan:  1.  I will order an MRI 1 year from her last one and call her with those results.  2.  I would like to alternate 6-month visits with Dr. Schmidt as well.  3.  She will continue her hormone blocking therapy and follow-up with Dr. Santiago      CPT coding:    Next Appointment:  No follow-ups on file.            EMR Dragon/transcription disclaimer:    Much of this encounter note is an electronic transcription/translocation of spoken language to printed text.  The electronic translation of spoken language may permit erroneous, or at times, nonsensical words or phrases to be inadvertently transcribed.  Although I have reviewed the note from such areas, some may still exist.

## 2022-05-04 NOTE — PROGRESS NOTES
May 4, 2022    Hello, may I speak with Mitra Celis?    My name is Kobe    I am  with [facility]  [address]  [facility] [address] [phone].    Before we get started may I verify your date of birth? 1970    I am calling to officially welcome you to our practice and ask about your recent visit. Is this a good time to talk? Yes, in office.    Tell me about your visit with us. What things went well?  Everything, check in, Dr. Bazan has great bed side manner.       We're always looking for ways to make our patients' experiences even better. Do you have recommendations on ways we may improve?  No    Overall were you satisfied with your first visit to our practice? No     I appreciate you taking the time to speak with me today. Is there anything else I can do for you? No      Thank you, and have a great day.

## 2022-05-04 NOTE — PROGRESS NOTES
Chief Complaint: Mitra Celis is a 52 y.o.. female here today for Abnormal Breast Imaging        History of Present Illness:  Patient presents with management of breast cancer risk.       Review of Systems:  Review of Systems   Skin:        The patient denies any noticeable changes to the skin of the breast.    All other systems reviewed and are negative.     I have reviewed the ROS as documented by the MA/LPN/RN Kira Blake MA      Past Medical and Surgical History:  Breast Biopsy History:  Patient has had the following breast biopsies:2021 [facility]  Breast Cancer HIstory:  Patient does not have a past medical history of breast cancer.  Breast Operations, and year:  2021 Excisional biopsy Dr. Costa performed  Social History     Tobacco Use   Smoking Status Never Smoker   Smokeless Tobacco Never Used     Obstetric History:  Patient does not menstruate, due to an ablation in the following year:2016   Number of pregnancies:1  Number of live births: 0  Number of abortions or miscarriages: 1  Age of delivery of first child: n/a  Patient did not breast feed.  Length of time taking birth control pills:20 years  Patient has never taken hormone replacement    Past Surgical History:   Procedure Laterality Date   • BREAST BIOPSY     • BREAST BIOPSY Right 12/20/2021    Procedure: Right breast needle-localized excisional biopsy;  Surgeon: Kathy Costa MD;  Location: [facility];  Service: General;  Laterality: Right;   • DILATATION AND CURETTAGE     • ENDOMETRIAL CRYOABLATION  1998   • EYE SURGERY      LASIK   • MOLE REMOVAL     • SINUS SURGERY  2012   • SKIN BIOPSY         Past Medical History:   Diagnosis Date   • Allergic rhinitis    • Anxiety and depression    • COVID 11/2020   • GERD (gastroesophageal reflux disease)    • Headache    • Migraines        Prior Hospitalizations, other than for surgery or childbirth, and year:  0    Social History:  Patient is single.  Patient has no children.    Family  History:  Family History   Problem Relation Age of Onset   • Breast cancer Mother         Diagnosed at 60 and 80    • Breast cancer Maternal Grandmother         Daignosed in her 60s   • Diabetes Paternal Grandmother    • Cancer Maternal Aunt         Spine Cancer    • Breast cancer Maternal Great-Grandmother    • Malig Hyperthermia Neg Hx        Vital Signs:  Vitals:    05/04/22 1318   BP: 124/78   Pulse: 99   SpO2: 99%       Medications:    Current Outpatient Prescriptions:     Current Outpatient Medications:   •  B COMPLEX VITAMINS PO, Take 1 tablet by mouth Daily., Disp: , Rfl:   •  Calcium Carb-Cholecalciferol (CALTRATE 600+D3 PO), Take 1 tablet by mouth Daily. HOLDING FOR OR, Disp: , Rfl:   •  escitalopram (LEXAPRO) 20 MG tablet, Take 20 mg by mouth Daily., Disp: , Rfl:   •  famotidine (PEPCID) 20 MG tablet, Take 40 mg by mouth Daily As Needed for Heartburn., Disp: , Rfl:   •  fluticasone (FLONASE) 50 MCG/ACT nasal spray, 2 sprays into the nostril(s) as directed by provider Daily As Needed for Rhinitis., Disp: , Rfl:   •  Loratadine (CLARITIN PO), Take 1 tablet by mouth., Disp: , Rfl:   •  multivitamin with minerals (MULTIVITAMIN ADULT PO), Take 1 tablet by mouth Daily. HOLDING FOR 7 DAYS PRIOR TO OR, Disp: , Rfl:   •  PROBIOTIC PRODUCT PO, Take 1 tablet by mouth Daily., Disp: , Rfl:   •  tamoxifen (NOLVADEX) 10 MG tablet, Take 1 tablet by mouth Every Other Day., Disp: 45 tablet, Rfl: 3    Physical Examination:  General Appearance:   Patient is in no distress.  She is well kept and has an {ARHPEBUILD:84357} build.   Psychiatric:  Patient with appropriate mood and affect. Alert and oriented to self, time, and place.    Breast, RIGHT:  {ARHPEBREASTSIZE:40723} sized, symmetric with the contralateral side.  Breast skin is without erythema, edema, rashes.  There are no visible abnormalities upon inspection during the arm-raising maneuver or with hands on hips in the sitting position. There is no nipple retraction,  discharge or nipple/areolar skin changes.There are no masses palpable in the sitting or supine positions.    Breast, LEFT:  {ARHPEBREASTSIZE:80041} sized, symmetric with the contralateral side.  Breast skin is without erythema, edema, rashes.  There are no visible abnormalities upon inspection during the arm-raising maneuver or with hands on hips in the sitting position. There is no nipple retraction, discharge or nipple/areolar skin changes.There are no masses palpable in the sitting or supine positions.    Lymphatic:  There is no axillary, cervical, infraclavicular, or supraclavicular adenopathy bilaterally.  Eyes:  Pupils are round and reactive to light.  Cardiovascular:  Heart rate and rhythm are regular.  Respiratory:  Lungs are clear bilaterally with no crackles or wheezes in any lung field.  Gastrointestinal:  Abdomen is soft, nondistended, and nontender. ***There are no scars from previous surgery.    Musculoskeletal:  Good strength in all 4 extremities.   {ARHPE range motion shoulder:44818}    Skin:  No new skin lesions or rashes on the skin excluding the breast (see breast exam above).    Assessment:  No diagnosis found.      Plan:  ***      CPT coding:    Next Appointment:  No follow-ups on file.            EMR Dragon/transcription disclaimer:    Much of this encounter note is an electronic transcription/translocation of spoken language to printed text.  The electronic translation of spoken language may permit erroneous, or at times, nonsensical words or phrases to be inadvertently transcribed.  Although I have reviewed the note from such areas, some may still exist.

## 2022-05-05 ENCOUNTER — TELEPHONE (OUTPATIENT)
Dept: MAMMOGRAPHY | Facility: CLINIC | Age: 52
End: 2022-05-05

## 2022-07-26 ENCOUNTER — OFFICE VISIT (OUTPATIENT)
Dept: ONCOLOGY | Facility: CLINIC | Age: 52
End: 2022-07-26

## 2022-07-26 ENCOUNTER — LAB (OUTPATIENT)
Dept: LAB | Facility: HOSPITAL | Age: 52
End: 2022-07-26

## 2022-07-26 VITALS
TEMPERATURE: 97.3 F | HEART RATE: 80 BPM | HEIGHT: 61 IN | SYSTOLIC BLOOD PRESSURE: 138 MMHG | BODY MASS INDEX: 30.83 KG/M2 | RESPIRATION RATE: 18 BRPM | OXYGEN SATURATION: 99 % | WEIGHT: 163.3 LBS | DIASTOLIC BLOOD PRESSURE: 87 MMHG

## 2022-07-26 DIAGNOSIS — N60.91 ATYPICAL LOBULAR HYPERPLASIA (ALH) OF RIGHT BREAST: Primary | ICD-10-CM

## 2022-07-26 PROCEDURE — 99214 OFFICE O/P EST MOD 30 MIN: CPT | Performed by: INTERNAL MEDICINE

## 2022-07-26 RX ORDER — VENLAFAXINE HYDROCHLORIDE 150 MG/1
150 TABLET, EXTENDED RELEASE ORAL DAILY
COMMUNITY
Start: 2022-07-21

## 2022-07-26 RX ORDER — VENLAFAXINE HYDROCHLORIDE 150 MG/1
TABLET, EXTENDED RELEASE ORAL
COMMUNITY
Start: 2022-07-21 | End: 2022-07-26 | Stop reason: SDUPTHER

## 2022-07-26 NOTE — PROGRESS NOTES
Subjective   Mitra Celis is a 52 y.o. female.  Referred by Dr. Costa for discussing risk reduction.    History of Present Illness   Ms. Olivarez is a 51-year-old postmenopausal  lady with a strong family history of breast cancer including her mother being diagnosed with breast cancer at the age of 59, 60 and a rsecond breast cancer at the age of 81.  Her maternal grandmother had breast cancer in his 60s and maternal great grandmother with history of breast cancer.  Given her family history she has been on high risk screening including annual mammograms and MRIs.  She underwent genetic testing with [employer] Common herditary cancers panel on 6/13/2018 and was negative.    3/16/2021-bilateral screening mammogram-benign.    9/27/2021-bilateral breast MRI-linear enhancement measuring 2.1 x 0.5 x 0.4 cm in the right breast at the 3:30 position, 5 cm from the nipple.  MR guided biopsy recommended.  No abnormalities of the left breast.    10/12/2021-MRI biopsy of the right breast abnormality.  Pathology consistent with focal atypical lobular hyperplasia.  Benign breast parenchyma with scattered hyalinized stromal fibrosis and focal clustered microcysts.  Microcalcifications associated benign breast ducts.    She was evaluated by Dr. Costa and since there was an abnormal enhancement on the MRI in the area of ALH it was decided to proceed with lumpectomy.    12/20/2021-right breast needle localized lumpectomy.  Benign breast parenchyma with biopsy site changes, stromal fibrosis and fibroadenomatoid and usual ductal hyperplasia.  No evidence of atypical hyperplasia, in situ or invasive carcinoma.    She has been referred to medical oncology for discussing risk reduction given strong family history and focal atypical lobular hyperplasia.    Maricarmen Meyer risk estimate has been calculated and lifetime risk of breast cancer being 37%    Tamoxifen started January 2022    Interval history  Patient presents today for  follow-up.  She is overall tolerating tamoxifen fairly well.  Reports that her core temperature has gone up but on that no problems.  Denies any new breast masses.  She had a screening mammogram in 2022 which was benign  Scheduled for a screening MRI in 2022.      The following portions of the patient's history were reviewed and updated as appropriate: allergies, current medications, past family history, past medical history, past social history, past surgical history and problem list.    Past Medical History:   Diagnosis Date   • Allergic rhinitis    • Anxiety and depression    • COVID 2020   • GERD (gastroesophageal reflux disease)    • Headache    • Migraines         Past Surgical History:   Procedure Laterality Date   • BREAST BIOPSY     • BREAST BIOPSY Right 2021    Procedure: Right breast needle-localized excisional biopsy;  Surgeon: Kathy Costa MD;  Location: [facility];  Service: General;  Laterality: Right;   • DILATATION AND CURETTAGE     • ENDOMETRIAL CRYOABLATION     • EYE SURGERY      LASIK   • MOLE REMOVAL     • SINUS SURGERY     • SKIN BIOPSY          Family History   Problem Relation Age of Onset   • Breast cancer Mother         Diagnosed at 60 and 80    • Breast cancer Maternal Grandmother         Daignosed in her 60s   • Diabetes Paternal Grandmother    • Cancer Maternal Aunt         Spine Cancer    • Breast cancer Maternal Great-Grandmother    • Malig Hyperthermia Neg Hx         Social History     Socioeconomic History   • Marital status:    • Number of children: 0   Tobacco Use   • Smoking status: Never Smoker   • Smokeless tobacco: Never Used   Vaping Use   • Vaping Use: Never used   Substance and Sexual Activity   • Alcohol use: Yes     Comment: SOCIAL (Wine/[place])   • Drug use: Never   • Sexual activity: Defer        OB History             Para        Term   0            AB        Living           SAB        IAB         "Ectopic        Molar        Multiple        Live Births                Age at menarche 10  Age at first childbirth-not applicable   1 para 0  1  Age at menopause-47  Oral contraceptive pill use for 20 years  Hormone replacement therapy-none    Allergies   Allergen Reactions   • Aleve [Naproxen Sodium] Anaphylaxis and Other (See Comments)     syncope   • Latex Rash     rash            Review of Systems   Constitutional: Negative.    HENT: Negative.    Eyes: Negative.    Respiratory: Negative.    Cardiovascular: Negative.    Gastrointestinal: Negative.    Endocrine: Negative.    Genitourinary: Negative.  Positive for amenorrhea.   Musculoskeletal: Negative.    Skin: Negative.    Allergic/Immunologic: Negative.    Neurological: Negative.    Hematological: Negative.    Psychiatric/Behavioral: Negative.      Review of systems as mentioned in the HPI    Objective   Blood pressure 138/87, pulse 80, temperature 97.3 °F (36.3 °C), temperature source Temporal, resp. rate 18, height 154.9 cm (60.98\"), weight 74.1 kg (163 lb 4.8 oz), SpO2 99 %.   Physical Exam  Vitals reviewed.   Constitutional:       Appearance: She is normal weight.   HENT:      Head: Normocephalic.      Right Ear: External ear normal.      Left Ear: External ear normal.      Nose: Nose normal.      Mouth/Throat:      Mouth: Mucous membranes are moist.   Eyes:      Extraocular Movements: Extraocular movements intact.      Pupils: Pupils are equal, round, and reactive to light.   Cardiovascular:      Rate and Rhythm: Normal rate and regular rhythm.      Pulses: Normal pulses.   Pulmonary:      Effort: Pulmonary effort is normal.   Abdominal:      General: Abdomen is flat. Bowel sounds are normal.   Musculoskeletal:         General: Normal range of motion.      Cervical back: Normal range of motion.   Skin:     General: Skin is warm.   Neurological:      General: No focal deficit present.      Mental Status: She is alert and oriented to person, " place, and time. Mental status is at baseline.   Psychiatric:         Mood and Affect: Mood normal.         Behavior: Behavior normal.         Thought Content: Thought content normal.         Judgment: Judgment normal.       Breast Exam: Right breast status post lumpectomy with periareolar incision.  Incision well-healed.  There is a seroma adjacent to the scar.  No other abnormalities.  Left breast appears normal .    I have reexamined the patient and the results are consistent with the previously documented exam. Mikki Santiago MD     No visits with results within 30 Day(s) from this visit.   Latest known visit with results is:   Lab on 01/18/2022   Component Date Value Ref Range Status   • Glucose 01/18/2022 105  74 - 124 mg/dL Final   • BUN 01/18/2022 14  6 - 20 mg/dL Final   • Creatinine 01/18/2022 0.74  0.60 - 1.10 mg/dL Final   • Sodium 01/18/2022 140  134 - 145 mmol/L Final   • Potassium 01/18/2022 5.7 (A) 3.5 - 4.7 mmol/L Final   • Chloride 01/18/2022 101  98 - 107 mmol/L Final   • CO2 01/18/2022 28.3  22.0 - 29.0 mmol/L Final   • Calcium 01/18/2022 10.1  8.5 - 10.2 mg/dL Final   • eGFR Non  Amer 01/18/2022 82  >60 mL/min/1.73 Final   • BUN/Creatinine Ratio 01/18/2022 18.9  7.3 - 30.0 Final   • Anion Gap 01/18/2022 10.7  5.0 - 15.0 mmol/L Final        No radiology results for the last 30 days.   1/17/[id number]-IXG 5.98, hemoglobin 16.2, platelets 231  1/17/2022 estradiol 21.5  LH 50.4  FSH 98.3      Assessment & Plan        *Focal atypical lobular hyperplasia  · Status post excision of the abnormal area of enhancement noted on the MRI.  No residual ALH seen.  · Given that she only had focal ALH risk of breast cancer in the next 5 years is 5%, 10 years is 12% and lifetime is 24%.  · However she also has a family history of breast cancer.  · Due to this the [facility] lifetime risk of breast cancer is around 37%.  · Explained to her about the elevated risk of breast cancer.  · Discussed risk  reduction with tamoxifen versus AI's.  · She has been on tamoxifen 5 mg dose since January 2022  · Tolerating treatment well  · Continue the same for total of 5 years  · Screening mammogram March 2022 benign  · MRI due September 2022    *Family history of breast cancer  · Start tamoxifen for risk reduction  · Genetic testing performed and patient as well as her mother were negative.    *Hypertension-blood pressure normal    *Follow-up-6 months

## 2022-09-21 DIAGNOSIS — F41.9 ANXIETY: Primary | ICD-10-CM

## 2022-09-21 RX ORDER — DIAZEPAM 5 MG/1
5 TABLET ORAL
Qty: 2 TABLET | Refills: 0 | Status: SHIPPED | OUTPATIENT
Start: 2022-09-21

## 2022-09-28 ENCOUNTER — APPOINTMENT (OUTPATIENT)
Dept: MRI IMAGING | Facility: HOSPITAL | Age: 52
End: 2022-09-28

## 2022-09-30 ENCOUNTER — APPOINTMENT (OUTPATIENT)
Dept: MRI IMAGING | Facility: HOSPITAL | Age: 52
End: 2022-09-30

## 2022-11-07 ENCOUNTER — TELEPHONE (OUTPATIENT)
Dept: MAMMOGRAPHY | Facility: CLINIC | Age: 52
End: 2022-11-07

## 2022-11-07 NOTE — TELEPHONE ENCOUNTER
I spoke with her today and told her that the recent MRI did not show any concerning features in either breast.  I will see her back in May 2023 unless she has some problems.

## 2022-12-19 RX ORDER — TAMOXIFEN CITRATE 10 MG/1
10 TABLET ORAL EVERY OTHER DAY
Qty: 45 TABLET | Refills: 0 | Status: SHIPPED | OUTPATIENT
Start: 2022-12-19 | End: 2023-03-13 | Stop reason: SDUPTHER

## 2022-12-22 ENCOUNTER — TELEPHONE (OUTPATIENT)
Dept: ONCOLOGY | Facility: CLINIC | Age: 52
End: 2022-12-22

## 2022-12-22 NOTE — TELEPHONE ENCOUNTER
Spoke with patient regarding [employer] insurance. Patient will fill out the Continuity of Care form, and if not approved will reach out to us for a referral.

## 2022-12-28 ENCOUNTER — TELEPHONE (OUTPATIENT)
Dept: ONCOLOGY | Facility: CLINIC | Age: 52
End: 2022-12-28

## 2022-12-28 NOTE — TELEPHONE ENCOUNTER
Caller: Mitra Celis    Relationship to patient: Self    Best call back number: [phone]    Type of visit: LAB & F/U 1     Requested date: HAS KENNY NEEDS TO R/S FOR MARCH, CALL TO R/S     If rescheduling, when is the original appointment: 1/3/2023

## 2023-03-13 ENCOUNTER — OFFICE VISIT (OUTPATIENT)
Dept: ONCOLOGY | Facility: CLINIC | Age: 53
End: 2023-03-13
Payer: COMMERCIAL

## 2023-03-13 ENCOUNTER — LAB (OUTPATIENT)
Dept: LAB | Facility: HOSPITAL | Age: 53
End: 2023-03-13
Payer: COMMERCIAL

## 2023-03-13 VITALS
WEIGHT: 160.1 LBS | HEIGHT: 61 IN | OXYGEN SATURATION: 99 % | HEART RATE: 83 BPM | RESPIRATION RATE: 16 BRPM | TEMPERATURE: 97.7 F | SYSTOLIC BLOOD PRESSURE: 146 MMHG | BODY MASS INDEX: 30.23 KG/M2 | DIASTOLIC BLOOD PRESSURE: 94 MMHG

## 2023-03-13 DIAGNOSIS — N60.91 ATYPICAL LOBULAR HYPERPLASIA (ALH) OF RIGHT BREAST: Primary | ICD-10-CM

## 2023-03-13 DIAGNOSIS — N60.91 ATYPICAL LOBULAR HYPERPLASIA (ALH) OF RIGHT BREAST: ICD-10-CM

## 2023-03-13 LAB
ALBUMIN SERPL-MCNC: 4.8 G/DL (ref 3.5–5.2)
ALBUMIN/GLOB SERPL: 1.7 G/DL (ref 1.1–2.4)
ALP SERPL-CCNC: 60 U/L (ref 38–116)
ALT SERPL W P-5'-P-CCNC: 20 U/L (ref 0–33)
ANION GAP SERPL CALCULATED.3IONS-SCNC: 13.6 MMOL/L (ref 5–15)
AST SERPL-CCNC: 20 U/L (ref 0–32)
BASOPHILS # BLD AUTO: 0.05 10*3/MM3 (ref 0–0.2)
BASOPHILS NFR BLD AUTO: 0.7 % (ref 0–1.5)
BILIRUB SERPL-MCNC: 0.3 MG/DL (ref 0.2–1.2)
BUN SERPL-MCNC: 20 MG/DL (ref 6–20)
BUN/CREAT SERPL: 26.3 (ref 7.3–30)
CALCIUM SPEC-SCNC: 10.1 MG/DL (ref 8.5–10.2)
CHLORIDE SERPL-SCNC: 105 MMOL/L (ref 98–107)
CO2 SERPL-SCNC: 23.4 MMOL/L (ref 22–29)
CREAT SERPL-MCNC: 0.76 MG/DL (ref 0.6–1.1)
DEPRECATED RDW RBC AUTO: 43.1 FL (ref 37–54)
EGFRCR SERPLBLD CKD-EPI 2021: 93.3 ML/MIN/1.73
EOSINOPHIL # BLD AUTO: 0.19 10*3/MM3 (ref 0–0.4)
EOSINOPHIL NFR BLD AUTO: 2.7 % (ref 0.3–6.2)
ERYTHROCYTE [DISTWIDTH] IN BLOOD BY AUTOMATED COUNT: 12.6 % (ref 12.3–15.4)
GLOBULIN UR ELPH-MCNC: 2.8 GM/DL (ref 1.8–3.5)
GLUCOSE SERPL-MCNC: 107 MG/DL (ref 74–124)
HCT VFR BLD AUTO: 45.9 % (ref 34–46.6)
HGB BLD-MCNC: 15.3 G/DL (ref 12–15.9)
IMM GRANULOCYTES # BLD AUTO: 0.03 10*3/MM3 (ref 0–0.05)
IMM GRANULOCYTES NFR BLD AUTO: 0.4 % (ref 0–0.5)
LYMPHOCYTES # BLD AUTO: 1.73 10*3/MM3 (ref 0.7–3.1)
LYMPHOCYTES NFR BLD AUTO: 24.5 % (ref 19.6–45.3)
MCH RBC QN AUTO: 31 PG (ref 26.6–33)
MCHC RBC AUTO-ENTMCNC: 33.3 G/DL (ref 31.5–35.7)
MCV RBC AUTO: 93.1 FL (ref 79–97)
MONOCYTES # BLD AUTO: 0.37 10*3/MM3 (ref 0.1–0.9)
MONOCYTES NFR BLD AUTO: 5.2 % (ref 5–12)
NEUTROPHILS NFR BLD AUTO: 4.69 10*3/MM3 (ref 1.7–7)
NEUTROPHILS NFR BLD AUTO: 66.5 % (ref 42.7–76)
NRBC BLD AUTO-RTO: 0 /100 WBC (ref 0–0.2)
PLATELET # BLD AUTO: 215 10*3/MM3 (ref 140–450)
PMV BLD AUTO: 10.3 FL (ref 6–12)
POTASSIUM SERPL-SCNC: 4.1 MMOL/L (ref 3.5–4.7)
PROT SERPL-MCNC: 7.6 G/DL (ref 6.3–8)
RBC # BLD AUTO: 4.93 10*6/MM3 (ref 3.77–5.28)
SODIUM SERPL-SCNC: 142 MMOL/L (ref 134–145)
WBC NRBC COR # BLD: 7.06 10*3/MM3 (ref 3.4–10.8)

## 2023-03-13 PROCEDURE — 80053 COMPREHEN METABOLIC PANEL: CPT

## 2023-03-13 PROCEDURE — 99214 OFFICE O/P EST MOD 30 MIN: CPT | Performed by: INTERNAL MEDICINE

## 2023-03-13 PROCEDURE — 36415 COLL VENOUS BLD VENIPUNCTURE: CPT

## 2023-03-13 PROCEDURE — 85025 COMPLETE CBC W/AUTO DIFF WBC: CPT

## 2023-03-13 RX ORDER — TAMOXIFEN CITRATE 10 MG/1
10 TABLET ORAL DAILY
Qty: 90 TABLET | Refills: 3 | Status: SHIPPED | OUTPATIENT
Start: 2023-03-13

## 2023-03-13 NOTE — PROGRESS NOTES
Subjective   Mitra Celis is a 52 y.o. female.  Referred by Dr. Costa for discussing risk reduction.    History of Present Illness   Ms. 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  Her maternal grandmother had breast cancer in his 60s and maternal great grandmother with history of breast cancer.  Given her family history she has been on high risk screening including annual mammograms and MRIs.  She underwent genetic testing with [employer] herditary cancers panel on 6/13/2018 and was negative.    3/16/2021-bilateral screening mammogram-benign.    9/27/2021-bilateral breast MRI-linear enhancement measuring 2.1 x 0.5 x 0.4 cm in the right breast at the 3:30 position, 5 cm from the nipple.  MR guided biopsy recommended.  No abnormalities of the left breast.    10/12/2021-MRI biopsy of the right breast abnormality.  Pathology consistent with focal atypical lobular hyperplasia.  Benign breast parenchyma with scattered hyalinized stromal fibrosis and focal clustered microcysts.  Microcalcifications associated benign breast ducts.    She was evaluated by Dr. Costa and since there was an abnormal enhancement on the MRI in the area of ALH it was decided to proceed with lumpectomy.    12/20/2021-right breast needle localized lumpectomy.  Benign breast parenchyma with biopsy site changes, stromal fibrosis and fibroadenomatoid and usual ductal hyperplasia.  No evidence of atypical hyperplasia, in situ or invasive carcinoma.    She has been referred to medical oncology for discussing risk reduction given strong family history and focal atypical lobular hyperplasia.    Maricarmen Meyer risk estimate has been calculated and lifetime risk of breast cancer being 37%    Tamoxifen started January 2022    Interval history  Patient presents today for  follow-up.  She is tolerating tamoxifen well.  She denies any new breast masses.  She had a screening MRI in 2022 which was benign.  Her next mammogram for 2023 has been scheduled.  No changes in family history.      The following portions of the patient's history were reviewed and updated as appropriate: allergies, current medications, past family history, past medical history, past social history, past surgical history and problem list.    Past Medical History:   Diagnosis Date   • Allergic rhinitis    • Anxiety and depression    • COVID 2020   • GERD (gastroesophageal reflux disease)    • Headache    • Migraines         Past Surgical History:   Procedure Laterality Date   • BREAST BIOPSY     • BREAST BIOPSY Right 2021    Procedure: Right breast needle-localized excisional biopsy;  Surgeon: Kathy Costa MD;  Location: [facility];  Service: General;  Laterality: Right;   • DILATATION AND CURETTAGE     • ENDOMETRIAL CRYOABLATION     • EYE SURGERY      LASIK   • MOLE REMOVAL     • SINUS SURGERY     • SKIN BIOPSY          Family History   Problem Relation Age of Onset   • Breast cancer Mother         Diagnosed at 60 and 80    • Breast cancer Maternal Grandmother         Daignosed in her 60s   • Diabetes Paternal Grandmother    • Cancer Maternal Aunt         Spine Cancer    • Breast cancer Maternal Great-Grandmother    • Malig Hyperthermia Neg Hx         Social History     Socioeconomic History   • Marital status:    • Number of children: 0   Tobacco Use   • Smoking status: Never   • Smokeless tobacco: Never   Vaping Use   • Vaping Use: Never used   Substance and Sexual Activity   • Alcohol use: Yes     Comment: SOCIAL (Wine/[place])   • Drug use: Never   • Sexual activity: Defer        OB History             Para        Term   0            AB        Living           SAB        IAB        Ectopic        Molar        Multiple        Live Births            "     Age at menarche 10  Age at first childbirth-not applicable   1 para 0  1  Age at menopause-47  Oral contraceptive pill use for 20 years  Hormone replacement therapy-none    Allergies   Allergen Reactions   • Aleve [Naproxen Sodium] Anaphylaxis and Other (See Comments)     syncope   • Latex Rash     rash            Review of Systems   Constitutional: Negative.    HENT: Negative.    Eyes: Negative.    Respiratory: Negative.    Cardiovascular: Negative.    Gastrointestinal: Negative.    Endocrine: Negative.    Genitourinary: Negative.  Positive for amenorrhea.   Musculoskeletal: Negative.    Skin: Negative.    Allergic/Immunologic: Negative.    Neurological: Negative.    Hematological: Negative.    Psychiatric/Behavioral: Negative.      Review of systems as mentioned in the HPI    Objective   Blood pressure 146/94, pulse 83, temperature 97.7 °F (36.5 °C), temperature source Temporal, resp. rate 16, height 154.9 cm (60.98\"), weight 72.6 kg (160 lb 1.6 oz), SpO2 99 %.   Physical Exam  Vitals reviewed.   Constitutional:       Appearance: She is normal weight.   HENT:      Head: Normocephalic.      Right Ear: External ear normal.      Left Ear: External ear normal.      Nose: Nose normal.      Mouth/Throat:      Mouth: Mucous membranes are moist.   Eyes:      Extraocular Movements: Extraocular movements intact.      Pupils: Pupils are equal, round, and reactive to light.   Cardiovascular:      Rate and Rhythm: Normal rate and regular rhythm.      Pulses: Normal pulses.   Pulmonary:      Effort: Pulmonary effort is normal.   Abdominal:      General: Abdomen is flat. Bowel sounds are normal.   Musculoskeletal:         General: Normal range of motion.      Cervical back: Normal range of motion.   Skin:     General: Skin is warm.   Neurological:      General: No focal deficit present.      Mental Status: She is alert and oriented to person, place, and time. Mental status is at baseline.   Psychiatric:       "   Mood and Affect: Mood normal.         Behavior: Behavior normal.         Thought Content: Thought content normal.         Judgment: Judgment normal.       Breast Exam: Right breast status post lumpectomy with periareolar incision.  Incision well-healed.  There is a seroma adjacent to the scar.  No other abnormalities.  Left breast appears normal .    I have reexamined the patient and the results are consistent with the previously documented exam. Mikki Santiago MD     Lab on 03/13/2023   Component Date Value Ref Range Status   • WBC 03/13/2023 7.06  3.40 - 10.80 10*3/mm3 Final   • RBC 03/13/2023 4.93  3.77 - 5.28 10*6/mm3 Final   • Hemoglobin 03/13/2023 15.3  12.0 - 15.9 g/dL Final   • Hematocrit 03/13/2023 45.9  34.0 - 46.6 % Final   • MCV 03/13/2023 93.1  79.0 - 97.0 fL Final   • MCH 03/13/2023 31.0  26.6 - 33.0 pg Final   • MCHC 03/13/2023 33.3  31.5 - 35.7 g/dL Final   • RDW 03/13/2023 12.6  12.3 - 15.4 % Final   • RDW-SD 03/13/2023 43.1  37.0 - 54.0 fl Final   • MPV 03/13/2023 10.3  6.0 - 12.0 fL Final   • Platelets 03/13/2023 215  140 - 450 10*3/mm3 Final   • Neutrophil % 03/13/2023 66.5  42.7 - 76.0 % Final   • Lymphocyte % 03/13/2023 24.5  19.6 - 45.3 % Final   • Monocyte % 03/13/2023 5.2  5.0 - 12.0 % Final   • Eosinophil % 03/13/2023 2.7  0.3 - 6.2 % Final   • Basophil % 03/13/2023 0.7  0.0 - 1.5 % Final   • Immature Grans % 03/13/2023 0.4  0.0 - 0.5 % Final   • Neutrophils, Absolute 03/13/2023 4.69  1.70 - 7.00 10*3/mm3 Final   • Lymphocytes, Absolute 03/13/2023 1.73  0.70 - 3.10 10*3/mm3 Final   • Monocytes, Absolute 03/13/2023 0.37  0.10 - 0.90 10*3/mm3 Final   • Eosinophils, Absolute 03/13/2023 0.19  0.00 - 0.40 10*3/mm3 Final   • Basophils, Absolute 03/13/2023 0.05  0.00 - 0.20 10*3/mm3 Final   • Immature Grans, Absolute 03/13/2023 0.03  0.00 - 0.05 10*3/mm3 Final   • nRBC 03/13/2023 0.0  0.0 - 0.2 /100 WBC Final        No radiology results for the last 30 days.   1/17/[id number]-[id number] 5.98,  hemoglobin 16.2, platelets 231  1/17/2022 estradiol 21.5  LH 50.4  FSH 98.3      Assessment & Plan        *Focal atypical lobular hyperplasia  · Status post excision of the abnormal area of enhancement noted on the MRI.  No residual ALH seen.  · Given that she only had focal ALH risk of breast cancer in the next 5 years is 5%, 10 years is 12% and lifetime is 24%.  · However she also has a family history of breast cancer.  · Due to this the [facility] lifetime risk of breast cancer is around 37%.  · Explained to her about the elevated risk of breast cancer.  · Discussed risk reduction with tamoxifen versus AI's.  · She has been on tamoxifen 5 mg dose since January 2022  · We discussed about increasing the dose to 10 mg daily.  She is willing to do the same.  · She is tolerating tamoxifen well without any significant issues.  However if she has any problems with the 10 mg dose then we will back off to 5 mg.  · Screening MRI November 2022 benign.  · Screening mammogram scheduled for April 2023    *Family history of breast cancer  · Start tamoxifen for risk reduction  · Genetic testing performed and patient as well as her mother were negative.  · No changes in family history    *Hypertension-blood pressure normal at 146/94    *Follow-up-6 months with APRN and 1 year with myself  Tamoxifen refill issued today

## 2023-05-31 ENCOUNTER — OFFICE VISIT (OUTPATIENT)
Dept: MAMMOGRAPHY | Facility: CLINIC | Age: 53
End: 2023-05-31

## 2023-05-31 VITALS
SYSTOLIC BLOOD PRESSURE: 140 MMHG | HEART RATE: 92 BPM | HEIGHT: 61 IN | OXYGEN SATURATION: 99 % | WEIGHT: 153 LBS | BODY MASS INDEX: 28.89 KG/M2 | DIASTOLIC BLOOD PRESSURE: 98 MMHG

## 2023-05-31 DIAGNOSIS — Z91.89 INCREASED RISK OF BREAST CANCER: Primary | ICD-10-CM

## 2023-05-31 DIAGNOSIS — N60.91 ATYPICAL LOBULAR HYPERPLASIA (ALH) OF RIGHT BREAST: ICD-10-CM

## 2023-05-31 NOTE — LETTER
May 31, 2023     Candace Schmidt MD  [address]  [address]  [facility] [address]    Patient: Mitra Celis   YOB: 1970   Date of Visit: 5/31/2023       Dear Dr. Brayan MD:    Thank you for referring Mitra Celis to me for evaluation. Below are the relevant portions of my assessment and plan of care.    If you have questions, please do not hesitate to call me. I look forward to following Mitra along with you.         Sincerely,        Fidencio Bazan MD        CC: MD Mikki Knowles MD Hoagland, Fidencio GRIMM MD  05/31/23 1409  Signed  Subjective    Mitra Celis is a 53 y.o. female     History of Present Illness   She is a nice 53-year-old white female with an elevated risk for breast cancer which is mainly due to a history of atypical lobular hyperplasia of the right breast diagnosed from an MRI abnormality in 2021.  Her family history is significant for mother who had breast cancer diagnosed 3 times (age 59, 60, and 81).  She also has a maternal grandmother with breast cancer in her 60s and a maternal great grandmother who also had breast cancer.  Genetic testing has been performed and the patient is negative for any pathologic mutations.    The patient's imaging is up-to-date.  Mammograms were last performed earlier this month.  The  patient states that she received a letter from the radiologist stating the negative findings.  The patient's last MRI was performed at [facility] on 11/2/2022.  I personally reviewed those imaging studies and do not see any areas of suspicious masslike or non-mass-like enhancement.  There are no concerning looking lymph nodes.    I also reviewed the notes from her last visit with Dr. Santiago in March.      Review of Systems constitutional-no unusual changes in weight or appetite.  Past Medical History:   Diagnosis Date   • Allergic rhinitis    • Anxiety and depression    • COVID 11/2020   • GERD (gastroesophageal reflux disease)     • Headache    • Migraines      Past Surgical History:   Procedure Laterality Date   • BREAST BIOPSY     • BREAST BIOPSY Right 12/20/2021    Procedure: Right breast needle-localized excisional biopsy;  Surgeon: Kathy Costa MD;  Location: [facility];  Service: General;  Laterality: Right;   • DILATATION AND CURETTAGE     • ENDOMETRIAL CRYOABLATION  1998   • EYE SURGERY      LASIK   • MOLE REMOVAL     • SINUS SURGERY  2012   • SKIN BIOPSY       Family History   Problem Relation Age of Onset   • Breast cancer Mother         Diagnosed at 60 and 80    • Breast cancer Maternal Grandmother         Daignosed in her 60s   • Diabetes Paternal Grandmother    • Cancer Maternal Aunt         Spine Cancer    • Breast cancer Maternal Great-Grandmother    • Malig Hyperthermia Neg Hx      Social History     Socioeconomic History   • Marital status:    • Number of children: 0   Tobacco Use   • Smoking status: Never   • Smokeless tobacco: Never   Vaping Use   • Vaping Use: Never used   Substance and Sexual Activity   • Alcohol use: Yes     Comment: SOCIAL (Wine/[place])   • Drug use: Never   • Sexual activity: Defer       Objective    Physical Exam   Constitutional-BMI 28.9, no acute distress  Right breast- large size and symmetrical.  The skin of the breast looks normal.  With the arms raised and the pectoralis muscle contracted there is no skin dimpling or nipple retraction.  I could not express any nipple discharge.  She has a nicely healed periareolar incision.  I do not palpate any worrisome masses anywhere else in the breast.  Left breast-large size and symmetrical.  The skin of the breast does not show any abnormalities.  There is no skin dimpling or nipple retraction when the arms are raised or the pectoralis muscles contracted.  There is also no evidence of nipple discharge.  I could not palpate any masses in the breast that were concerning.  Lymphatics-no cervical or axillary adenopathy.    Assessment &  Plan   At high risk for breast cancer-previous risk assessment has estimated her lifetime risk around 37%.  Her 5-year risk is about 5%.  As a result the patient is on chemoprevention with tamoxifen and seems to be tolerating that well.  She does follow closely with .  The patient's imaging is up-to-date.  I will order next year's MRI and call her with those results.  If the imaging remains stable I will continue alternating 6-month visits with Dr. Santiago.  I plan to see her back in the office in 1 year.    The primary encounter diagnosis was Increased risk of breast cancer. A diagnosis of Atypical lobular hyperplasia (ALH) of right breast was also pertinent to this visit.

## 2023-05-31 NOTE — PROGRESS NOTES
Subjective   Mitra Celis is a 53 y.o. female     History of Present Illness   She is a nice 53-year-old white female with an elevated risk for breast cancer which is mainly due to a history of atypical lobular hyperplasia of the right breast diagnosed from an MRI abnormality in 2021.  Her family history is significant for mother who had breast cancer diagnosed 3 times (age 59, 60, and 81).  She also has a maternal grandmother with breast cancer in her 60s and a maternal great grandmother who also had breast cancer.  Genetic testing has been performed and the patient is negative for any pathologic mutations.    The patient's imaging is up-to-date.  Mammograms were last performed earlier this month.  The  patient states that she received a letter from the radiologist stating the negative findings.  The patient's last MRI was performed at [facility] on 11/2/2022.  I personally reviewed those imaging studies and do not see any areas of suspicious masslike or non-mass-like enhancement.  There are no concerning looking lymph nodes.    I also reviewed the notes from her last visit with Dr. Santiago in March.      Review of Systems constitutional-no unusual changes in weight or appetite.  Past Medical History:   Diagnosis Date   • Allergic rhinitis    • Anxiety and depression    • COVID 11/2020   • GERD (gastroesophageal reflux disease)    • Headache    • Migraines      Past Surgical History:   Procedure Laterality Date   • BREAST BIOPSY     • BREAST BIOPSY Right 12/20/2021    Procedure: Right breast needle-localized excisional biopsy;  Surgeon: Kathy Costa MD;  Location: [facility];  Service: General;  Laterality: Right;   • DILATATION AND CURETTAGE     • ENDOMETRIAL CRYOABLATION  1998   • EYE SURGERY      LASIK   • MOLE REMOVAL     • SINUS SURGERY  2012   • SKIN BIOPSY       Family History   Problem Relation Age of Onset   • Breast cancer Mother         Diagnosed at 60 and 80    • Breast cancer Maternal  Grandmother         Daignosed in her 60s   • Diabetes Paternal Grandmother    • Cancer Maternal Aunt         Spine Cancer    • Breast cancer Maternal Great-Grandmother    • Malig Hyperthermia Neg Hx      Social History     Socioeconomic History   • Marital status:    • Number of children: 0   Tobacco Use   • Smoking status: Never   • Smokeless tobacco: Never   Vaping Use   • Vaping Use: Never used   Substance and Sexual Activity   • Alcohol use: Yes     Comment: SOCIAL (Wine/[place])   • Drug use: Never   • Sexual activity: Defer       Objective   Physical Exam   Constitutional-BMI 28.9, no acute distress  Right breast- large size and symmetrical.  The skin of the breast looks normal.  With the arms raised and the pectoralis muscle contracted there is no skin dimpling or nipple retraction.  I could not express any nipple discharge.  She has a nicely healed periareolar incision.  I do not palpate any worrisome masses anywhere else in the breast.  Left breast-large size and symmetrical.  The skin of the breast does not show any abnormalities.  There is no skin dimpling or nipple retraction when the arms are raised or the pectoralis muscles contracted.  There is also no evidence of nipple discharge.  I could not palpate any masses in the breast that were concerning.  Lymphatics-no cervical or axillary adenopathy.    Assessment & Plan   At high risk for breast cancer-previous risk assessment has estimated her lifetime risk around 37%.  Her 5-year risk is about 5%.  As a result the patient is on chemoprevention with tamoxifen and seems to be tolerating that well.  She does follow closely with .  The patient's imaging is up-to-date.  I will order next year's MRI and call her with those results.  If the imaging remains stable I will continue alternating 6-month visits with Dr. Santiago.  I plan to see her back in the office in 1 year.    The primary encounter diagnosis was Increased risk of breast cancer. A  diagnosis of Atypical lobular hyperplasia (ALH) of right breast was also pertinent to this visit.

## 2023-06-14 RX ORDER — TAMOXIFEN CITRATE 10 MG/1
10 TABLET ORAL DAILY
Qty: 90 TABLET | Refills: 3 | Status: SHIPPED | OUTPATIENT
Start: 2023-06-14

## 2023-06-14 NOTE — TELEPHONE ENCOUNTER
Caller: Kemi Celiskelli CASTILLO    Relationship: Self    Best call back number: [phone]    Requested Prescriptions:   Requested Prescriptions     Pending Prescriptions Disp Refills    tamoxifen (NOLVADEX) 10 MG tablet 90 tablet 3     Sig: Take 1 tablet by mouth Daily.        Pharmacy where request should be sent: [facility] - [address] [phone] [facility] [phone]      Last office visit with prescribing clinician: 3/13/2023   Last telemedicine visit with prescribing clinician: Visit date not found   Next office visit with prescribing clinician: 3/11/2024     Additional details provided by patient: ANGUS IS NEEDING 90 DAY SUPPLY SENT TO NEW PHARMACY [facility]     Does the patient have less than a 3 day supply:  [] Yes  [x] No    Would you like a call back once the refill request has been completed: [x] Yes [] No    If the office needs to give you a call back, can they leave a voicemail: [] Yes [x] No    Leslee Caputo   06/14/23 10:20 EDT          DVT PPx  -Lovenox  PT recs: home with home PT to improve functional mobility and safely negotiate the home environment. Pt will require a RW for safe amb.  Pt today states that she wants to go to [facility]. Need to follow-up with oncologist regarding her chemotherapy regimen, whether weekly or not. Will influence d/c planning.

## 2023-09-11 ENCOUNTER — OFFICE VISIT (OUTPATIENT)
Dept: ONCOLOGY | Facility: CLINIC | Age: 53
End: 2023-09-11
Payer: COMMERCIAL

## 2023-09-11 VITALS
WEIGHT: 158.1 LBS | TEMPERATURE: 98 F | DIASTOLIC BLOOD PRESSURE: 100 MMHG | HEART RATE: 77 BPM | HEIGHT: 61 IN | SYSTOLIC BLOOD PRESSURE: 140 MMHG | BODY MASS INDEX: 29.85 KG/M2 | OXYGEN SATURATION: 100 % | RESPIRATION RATE: 16 BRPM

## 2023-09-11 DIAGNOSIS — N60.91 ATYPICAL LOBULAR HYPERPLASIA (ALH) OF RIGHT BREAST: Primary | ICD-10-CM

## 2023-09-11 DIAGNOSIS — Z91.89 INCREASED RISK OF BREAST CANCER: ICD-10-CM

## 2023-09-11 DIAGNOSIS — N64.52 DISCHARGE FROM RIGHT NIPPLE: ICD-10-CM

## 2023-09-11 NOTE — PROGRESS NOTES
Subjective   Mitra Celis is a 53 y.o. female.  Referred by Dr. Costa for discussing risk reduction.    History of Present Illness   Ms. 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  Her maternal grandmother had breast cancer in his 60s and maternal great grandmother with history of breast cancer.  Given her family history she has been on high risk screening including annual mammograms and MRIs.  She underwent genetic testing with [facility] herditary cancers panel on 6/13/2018 and was negative.    3/16/2021-bilateral screening mammogram-benign.    9/27/2021-bilateral breast MRI-linear enhancement measuring 2.1 x 0.5 x 0.4 cm in the right breast at the 3:30 position, 5 cm from the nipple.  MR guided biopsy recommended.  No abnormalities of the left breast.    10/12/2021-MRI biopsy of the right breast abnormality.  Pathology consistent with focal atypical lobular hyperplasia.  Benign breast parenchyma with scattered hyalinized stromal fibrosis and focal clustered microcysts.  Microcalcifications associated benign breast ducts.    She was evaluated by Dr. Costa and since there was an abnormal enhancement on the MRI in the area of ALH it was decided to proceed with lumpectomy.    12/20/2021-right breast needle localized lumpectomy.  Benign breast parenchyma with biopsy site changes, stromal fibrosis and fibroadenomatoid and usual ductal hyperplasia.  No evidence of atypical hyperplasia, in situ or invasive carcinoma.    She has been referred to medical oncology for discussing risk reduction given strong family history and focal atypical lobular hyperplasia.    Maricarmen Meyer risk estimate has been calculated and lifetime risk of breast cancer being 37%    Tamoxifen started January 2022    Interval history  Patient returns today,  9/11/2023 for 6-month follow-up.  She continues on tamoxifen and is overall tolerating it fairly well.  [facility]  We do not have a copy of that result, but the patient states that it was benign.  Patient states a few weeks ago she noticed some whitish nipple discharge from her right breast.  She has not had any further issues since that one episode.  She denies any pain, or palpable mass or nodularity.  She is not having fevers or chills.    No other new problems or concerns today.    The following portions of the patient's history were reviewed and updated as appropriate: allergies, current medications, past family history, past medical history, past social history, past surgical history and problem list.    Past Medical History:   Diagnosis Date    Abnormal Pap smear of cervix 1998    Allergic rhinitis     Anxiety and depression     COVID 11/2020    Depression     GERD (gastroesophageal reflux disease)     Headache     Migraines         Past Surgical History:   Procedure Laterality Date    BREAST BIOPSY      BREAST BIOPSY Right 12/20/2021    Procedure: Right breast needle-localized excisional biopsy;  Surgeon: Kathy Costa MD;  Location: [facility];  Service: General;  Laterality: Right;    DILATATION AND CURETTAGE      ENDOMETRIAL CRYOABLATION  1998    EYE SURGERY      LASIK    MOLE REMOVAL      SINUS SURGERY  2012    SKIN BIOPSY          Family History   Problem Relation Age of Onset    Breast cancer Mother         Diagnosed at 60 and 80     Breast cancer Maternal Aunt     Cancer Maternal Aunt         Spine Cancer     Breast cancer Maternal Grandmother         Daignosed in her 60s    Diabetes Paternal Grandmother     Breast cancer Maternal Great-Grandmother     Malig Hyperthermia Neg Hx         Social History     Socioeconomic History    Marital status:      Spouse name: Jaret    Number of children: 0   Tobacco Use    Smoking status: Never     "Smokeless tobacco: Never   Vaping Use    Vaping Use: Never used   Substance and Sexual Activity    Alcohol use: Yes     Comment: SOCIAL (Wine/[place])    Drug use: Never    Sexual activity: Defer        OB History               Para        Term   0            AB        Living             SAB        IAB        Ectopic        Molar        Multiple        Live Births                Age at menarche 10  Age at first childbirth-not applicable   1 para 0  1  Age at menopause-47  Oral contraceptive pill use for 20 years  Hormone replacement therapy-none    Allergies   Allergen Reactions    Aleve [Naproxen Sodium] Anaphylaxis and Other (See Comments)     syncope    Latex Rash     rash            Review of Systems   Constitutional: Negative.    HENT: Negative.     Eyes: Negative.    Respiratory: Negative.     Cardiovascular: Negative.    Gastrointestinal: Negative.    Endocrine: Negative.    Genitourinary: Negative.  Positive for amenorrhea.   Musculoskeletal: Negative.    Skin: Negative.    Allergic/Immunologic: Negative.    Neurological: Negative.    Hematological: Negative.    Psychiatric/Behavioral: Negative.     Review of systems as mentioned in the HPI    Objective   Blood pressure 140/100, pulse 77, temperature 98 °F (36.7 °C), temperature source Temporal, resp. rate 16, height 154 cm (60.63\"), weight 71.7 kg (158 lb 1.6 oz), SpO2 100 %.   Physical Exam  Vitals reviewed.   Constitutional:       General: She is not in acute distress.     Appearance: Normal appearance. She is well-developed.   HENT:      Head: Normocephalic and atraumatic.      Mouth/Throat:      Pharynx: No oropharyngeal exudate.   Eyes:      Pupils: Pupils are equal, round, and reactive to light.   Cardiovascular:      Rate and Rhythm: Normal rate and regular rhythm.      Heart sounds: Normal heart sounds. No murmur heard.  Pulmonary:      Effort: Pulmonary effort is normal. No respiratory distress.      Breath sounds: " Normal breath sounds. No wheezing, rhonchi or rales.   Abdominal:      General: Bowel sounds are normal. There is no distension.      Palpations: Abdomen is soft.   Musculoskeletal:         General: Normal range of motion.      Cervical back: Normal range of motion.   Lymphadenopathy:      Cervical: No cervical adenopathy.      Upper Body:      Right upper body: No supraclavicular or axillary adenopathy.      Left upper body: No supraclavicular or axillary adenopathy.   Skin:     General: Skin is warm and dry.      Findings: No rash.   Neurological:      Mental Status: She is alert and oriented to person, place, and time.     Breast Exam: Right breast status post lumpectomy with periareolar incision.  Incision well-healed.  There is a seroma adjacent to the scar.  No other abnormalities.  No nipple discharge present on exam today.  Left breast appears normal .,  No palpable mass or nodularity.    I have reexamined the patient and the results are consistent with the previously documented exam. Dina Oseguera, APRN     No visits with results within 30 Day(s) from this visit.   Latest known visit with results is:   Lab on 03/13/2023   Component Date Value Ref Range Status    Glucose 03/13/2023 107  74 - 124 mg/dL Final    BUN 03/13/2023 20  6 - 20 mg/dL Final    Creatinine 03/13/2023 0.76  0.60 - 1.10 mg/dL Final    Sodium 03/13/2023 142  134 - 145 mmol/L Final    Potassium 03/13/2023 4.1  3.5 - 4.7 mmol/L Final    Chloride 03/13/2023 105  98 - 107 mmol/L Final    CO2 03/13/2023 23.4  22.0 - 29.0 mmol/L Final    Calcium 03/13/2023 10.1  8.5 - 10.2 mg/dL Final    Total Protein 03/13/2023 7.6  6.3 - 8.0 g/dL Final    Albumin 03/13/2023 4.8  3.5 - 5.2 g/dL Final    ALT (SGPT) 03/13/2023 20  0 - 33 U/L Final    AST (SGOT) 03/13/2023 20  0 - 32 U/L Final    Alkaline Phosphatase 03/13/2023 60  38 - 116 U/L Final    Total Bilirubin 03/13/2023 0.3  0.2 - 1.2 mg/dL Final    Globulin 03/13/2023 2.8  1.8 - 3.5 gm/dL Final     A/G Ratio 03/13/2023 1.7  1.1 - 2.4 g/dL Final    BUN/Creatinine Ratio 03/13/2023 26.3  7.3 - 30.0 Final    Anion Gap 03/13/2023 13.6  5.0 - 15.0 mmol/L Final    eGFR 03/13/2023 93.3  >60.0 mL/min/1.73 Final    WBC 03/13/2023 7.06  3.40 - 10.80 10*3/mm3 Final    RBC 03/13/2023 4.93  3.77 - 5.28 10*6/mm3 Final    Hemoglobin 03/13/2023 15.3  12.0 - 15.9 g/dL Final    Hematocrit 03/13/2023 45.9  34.0 - 46.6 % Final    MCV 03/13/2023 93.1  79.0 - 97.0 fL Final    MCH 03/13/2023 31.0  26.6 - 33.0 pg Final    MCHC 03/13/2023 33.3  31.5 - 35.7 g/dL Final    RDW 03/13/2023 12.6  12.3 - 15.4 % Final    RDW-SD 03/13/2023 43.1  37.0 - 54.0 fl Final    MPV 03/13/2023 10.3  6.0 - 12.0 fL Final    Platelets 03/13/2023 215  140 - 450 10*3/mm3 Final    Neutrophil % 03/13/2023 66.5  42.7 - 76.0 % Final    Lymphocyte % 03/13/2023 24.5  19.6 - 45.3 % Final    Monocyte % 03/13/2023 5.2  5.0 - 12.0 % Final    Eosinophil % 03/13/2023 2.7  0.3 - 6.2 % Final    Basophil % 03/13/2023 0.7  0.0 - 1.5 % Final    Immature Grans % 03/13/2023 0.4  0.0 - 0.5 % Final    Neutrophils, Absolute 03/13/2023 4.69  1.70 - 7.00 10*3/mm3 Final    Lymphocytes, Absolute 03/13/2023 1.73  0.70 - 3.10 10*3/mm3 Final    Monocytes, Absolute 03/13/2023 0.37  0.10 - 0.90 10*3/mm3 Final    Eosinophils, Absolute 03/13/2023 0.19  0.00 - 0.40 10*3/mm3 Final    Basophils, Absolute 03/13/2023 0.05  0.00 - 0.20 10*3/mm3 Final    Immature Grans, Absolute 03/13/2023 0.03  0.00 - 0.05 10*3/mm3 Final    nRBC 03/13/2023 0.0  0.0 - 0.2 /100 WBC Final        No radiology results for the last 30 days.   1/17/20225225-NBZ-QMW 5.98, hemoglobin 16.2, platelets 231  1/17/2022 estradiol 21.5  LH 50.4  FSH 98.3      Assessment & Plan        *Focal atypical lobular hyperplasia  Status post excision of the abnormal area of enhancement noted on the MRI.  No residual ALH seen.  Given that she only had focal ALH risk of breast cancer in the next 5 years is 5%, 10 years is 12% and lifetime is  24%.  However she also has a family history of breast cancer.  Due to this the [facility] lifetime risk of breast cancer is around 37%.  Explained to her about the elevated risk of breast cancer.  Discussed risk reduction with tamoxifen versus AI's.  She has been on tamoxifen 5 mg dose since January 2022  We discussed about increasing the dose to 10 mg daily.  She is willing to do the same.  She is tolerating tamoxifen well without any significant issues.  However if she has any problems with the 10 mg dose then we will back off to 5 mg.  Screening MRI November 2022 benign.  Screening mammogram performed April 2023, patient reports it was benign we will request a copy of those results.  Patient returns 9/11/2023 reporting an episode of nipple discharge from her right breast a few weeks ago.  There is no evidence of this on exam today.  Discussed with Dr. Santiago we will send the patient for diagnostic right breast mammogram.      *Family history of breast cancer  Start tamoxifen for risk reduction  Genetic testing performed and patient as well as her mother were negative.  No changes in family history    *Hypertension-patient reports issues with white coat syndrome and that her BP at home is normal.       PLAN:  Schedule patient for right breast diagnostic mammogram given reports of recent nipple discharge.  Continue tamoxifen  MRI due in November  Follow up in 6 months with Dr. Santiago.  Call/ return sooner should the patient develop any new concerns or problems.      Patient is on a high risk medication requiring close monitoring for toxicity.      Dina Oseguera, DIRK  09/11/2023

## 2023-09-19 ENCOUNTER — APPOINTMENT (OUTPATIENT)
Dept: WOMENS IMAGING | Facility: HOSPITAL | Age: 53
End: 2023-09-19
Payer: COMMERCIAL

## 2023-09-19 PROCEDURE — G0279 TOMOSYNTHESIS, MAMMO: HCPCS | Performed by: RADIOLOGY

## 2023-09-19 PROCEDURE — 76642 ULTRASOUND BREAST LIMITED: CPT | Performed by: RADIOLOGY

## 2023-09-19 PROCEDURE — 77065 DX MAMMO INCL CAD UNI: CPT | Performed by: RADIOLOGY

## 2023-09-19 PROCEDURE — 77061 BREAST TOMOSYNTHESIS UNI: CPT | Performed by: RADIOLOGY

## 2023-11-04 DIAGNOSIS — F41.9 ANXIETY: ICD-10-CM

## 2023-11-04 RX ORDER — DIAZEPAM 5 MG/1
5 TABLET ORAL
Qty: 2 TABLET | Refills: 0 | Status: SHIPPED | OUTPATIENT
Start: 2023-11-04

## 2023-11-15 ENCOUNTER — TELEPHONE (OUTPATIENT)
Dept: MAMMOGRAPHY | Facility: CLINIC | Age: 53
End: 2023-11-15
Payer: COMMERCIAL

## 2023-11-15 NOTE — TELEPHONE ENCOUNTER
I spoke with her today and told her that the recent MRI at [facility] was negative.  They did not see anything other than some postsurgical changes in the right breast.  She is scheduled to see me in May 2024.

## 2024-01-07 ENCOUNTER — TELEPHONE (OUTPATIENT)
Dept: MAMMOGRAPHY | Facility: CLINIC | Age: 54
End: 2024-01-07
Payer: COMMERCIAL

## 2024-01-07 NOTE — TELEPHONE ENCOUNTER
LM  MRI looks fine, nothing suspicious.   OV in May 2024   [Physical Growth and Development] : physical growth and development [Social and Academic Competence] : social and academic competence [Emotional Well-Being] : emotional well-being [Risk Reduction] : risk reduction [Violence and Injury Prevention] : violence and injury prevention [Mother] : mother [FreeTextEntry1] : \par 9 yo M presenting for WCC. No high risk behaviors. Appropriate growth; improved BMI.\par \par Continue balanced diet with all food groups. Brush teeth twice a day with toothbrush. Recommend visit to dentist. Help child to maintain consistent daily routines and sleep schedule. Personal hygiene and puberty explained. School discussed. Ensure home is safe. Teach child about personal safety. Use consistent, positive discipline. Limit screen time to no more than 2 hours per day. Encourage physical activity.\par Return 1 year for routine well child check.\par Extensive discussion held regarding HPV vaccine; will decided about it for next year\par

## 2024-03-18 ENCOUNTER — OFFICE VISIT (OUTPATIENT)
Dept: ONCOLOGY | Facility: CLINIC | Age: 54
End: 2024-03-18
Payer: COMMERCIAL

## 2024-03-18 VITALS
RESPIRATION RATE: 18 BRPM | BODY MASS INDEX: 31.45 KG/M2 | SYSTOLIC BLOOD PRESSURE: 168 MMHG | TEMPERATURE: 98.2 F | WEIGHT: 166.6 LBS | HEIGHT: 61 IN | HEART RATE: 85 BPM | DIASTOLIC BLOOD PRESSURE: 109 MMHG | OXYGEN SATURATION: 97 %

## 2024-03-18 DIAGNOSIS — Z91.89 INCREASED RISK OF BREAST CANCER: ICD-10-CM

## 2024-03-18 DIAGNOSIS — N60.91 ATYPICAL LOBULAR HYPERPLASIA (ALH) OF RIGHT BREAST: Primary | ICD-10-CM

## 2024-03-18 PROCEDURE — 99214 OFFICE O/P EST MOD 30 MIN: CPT | Performed by: INTERNAL MEDICINE

## 2024-03-18 RX ORDER — TAMOXIFEN CITRATE 10 MG/1
10 TABLET ORAL DAILY
Qty: 90 TABLET | Refills: 3 | Status: SHIPPED | OUTPATIENT
Start: 2024-03-18

## 2024-03-18 NOTE — PROGRESS NOTES
Subjective   Mitra Celis is a 53 y.o. female.  Referred by Dr. Costa for discussing risk reduction.    History of Present Illness   Ms. 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  Her maternal grandmother had breast cancer in his 60s and maternal great grandmother with history of breast cancer.  Given her family history she has been on high risk screening including annual mammograms and MRIs.  She underwent genetic testing with [email or web address] Common herditary cancers panel on 6/13/2018 and was negative.    3/16/2021-bilateral screening mammogram-benign.    9/27/2021-bilateral breast MRI-linear enhancement measuring 2.1 x 0.5 x 0.4 cm in the right breast at the 3:30 position, 5 cm from the nipple.  MR guided biopsy recommended.  No abnormalities of the left breast.    10/12/2021-MRI biopsy of the right breast abnormality.  Pathology consistent with focal atypical lobular hyperplasia.  Benign breast parenchyma with scattered hyalinized stromal fibrosis and focal clustered microcysts.  Microcalcifications associated benign breast ducts.    She was evaluated by Dr. Costa and since there was an abnormal enhancement on the MRI in the area of ALH it was decided to proceed with lumpectomy.    12/20/2021-right breast needle localized lumpectomy.  Benign breast parenchyma with biopsy site changes, stromal fibrosis and fibroadenomatoid and usual ductal hyperplasia.  No evidence of atypical hyperplasia, in situ or invasive carcinoma.    She has been referred to medical oncology for discussing risk reduction given strong family history and focal atypical lobular hyperplasia.    Maricarmen Meyer risk estimate has been calculated and lifetime risk of breast cancer being 37%    Tamoxifen started January 2022    Interval history  Patient returns today for  follow-up.  She continues on 10 mg of tamoxifen.  She has noticed increased vaginal discharge to a point where she is having to use some thin pants to help with that.  She has a gynecological exam coming up in spring.  Denies any new breast masses.  She is up-to-date on her screening mammogram as well as MRI.  11/7/2023-bilateral breast MRI benign.  She is scheduled to see Dr. Finn in May 2024 and plans to get the mammogram at her office.      The following portions of the patient's history were reviewed and updated as appropriate: allergies, current medications, past family history, past medical history, past social history, past surgical history and problem list.    Past Medical History:   Diagnosis Date    Abnormal Pap smear of cervix 1998    Allergic rhinitis     Anxiety and depression     COVID 11/2020    Depression     GERD (gastroesophageal reflux disease)     Headache     Migraines         Past Surgical History:   Procedure Laterality Date    BREAST BIOPSY      BREAST BIOPSY Right 12/20/2021    Procedure: Right breast needle-localized excisional biopsy;  Surgeon: Kathy Costa MD;  Location: [facility];  Service: General;  Laterality: Right;    DILATATION AND CURETTAGE      ENDOMETRIAL CRYOABLATION  1998    EYE SURGERY      LASIK    MOLE REMOVAL      SINUS SURGERY  2012    SKIN BIOPSY          Family History   Problem Relation Age of Onset    Breast cancer Mother         Diagnosed at 60 and 80     Breast cancer Maternal Aunt     Cancer Maternal Aunt         Spine Cancer     Breast cancer Maternal Grandmother         Daignosed in her 60s    Diabetes Paternal Grandmother     Breast cancer Maternal Great-Grandmother     Malig Hyperthermia Neg Hx         Social History     Socioeconomic History    Marital status:      Spouse name: Jaret    Number of children: 0   Tobacco Use    Smoking status: Never    Smokeless tobacco: Never   Vaping Use    Vaping status: Never Used   Substance and Sexual  Activity    Alcohol use: Yes     Comment: SOCIAL (Wine/[place]ury)    Drug use: Never    Sexual activity: Defer        OB History               Para        Term   0            AB        Living             SAB        IAB        Ectopic        Molar        Multiple        Live Births                Age at menarche 10  Age at first childbirth-not applicable   1 para 0  1  Age at menopause-47  Oral contraceptive pill use for 20 years  Hormone replacement therapy-none    Allergies   Allergen Reactions    Aleve [Naproxen Sodium] Anaphylaxis and Other (See Comments)     syncope    Latex Rash     rash            Review of Systems   Constitutional: Negative.    HENT: Negative.     Eyes: Negative.    Respiratory: Negative.     Cardiovascular: Negative.    Gastrointestinal: Negative.    Endocrine: Negative.    Genitourinary: Negative.  Positive for amenorrhea.   Musculoskeletal: Negative.    Skin: Negative.    Allergic/Immunologic: Negative.    Neurological: Negative.    Hematological: Negative.    Psychiatric/Behavioral: Negative.       Review of systems as mentioned HPI otherwise negative    Objective   There were no vitals taken for this visit.   Physical Exam  Vitals reviewed.   Constitutional:       General: She is not in acute distress.     Appearance: Normal appearance. She is well-developed.   HENT:      Head: Normocephalic and atraumatic.      Mouth/Throat:      Pharynx: No oropharyngeal exudate.   Eyes:      Pupils: Pupils are equal, round, and reactive to light.   Cardiovascular:      Rate and Rhythm: Normal rate and regular rhythm.      Heart sounds: Normal heart sounds. No murmur heard.  Pulmonary:      Effort: Pulmonary effort is normal. No respiratory distress.      Breath sounds: Normal breath sounds. No wheezing, rhonchi or rales.   Abdominal:      General: Bowel sounds are normal. There is no distension.      Palpations: Abdomen is soft.   Musculoskeletal:         General: Normal  range of motion.      Cervical back: Normal range of motion.   Lymphadenopathy:      Cervical: No cervical adenopathy.      Upper Body:      Right upper body: No supraclavicular or axillary adenopathy.      Left upper body: No supraclavicular or axillary adenopathy.   Skin:     General: Skin is warm and dry.      Findings: No rash.   Neurological:      Mental Status: She is alert and oriented to person, place, and time.       Breast Exam: Right breast status post lumpectomy with periareolar incision.  Incision well-healed.  There is a seroma adjacent to the scar.  No other abnormalities.  No nipple discharge present on exam today.  Left breast appears normal .,  No palpable mass or nodularity.    I have reexamined the patient and the results are consistent with the previously documented exam. Mikki Santiago MD      No visits with results within 30 Day(s) from this visit.   Latest known visit with results is:   Lab on 03/13/2023   Component Date Value Ref Range Status    Glucose 03/13/2023 107  74 - 124 mg/dL Final    BUN 03/13/2023 20  6 - 20 mg/dL Final    Creatinine 03/13/2023 0.76  0.60 - 1.10 mg/dL Final    Sodium 03/13/2023 142  134 - 145 mmol/L Final    Potassium 03/13/2023 4.1  3.5 - 4.7 mmol/L Final    Chloride 03/13/2023 105  98 - 107 mmol/L Final    CO2 03/13/2023 23.4  22.0 - 29.0 mmol/L Final    Calcium 03/13/2023 10.1  8.5 - 10.2 mg/dL Final    Total Protein 03/13/2023 7.6  6.3 - 8.0 g/dL Final    Albumin 03/13/2023 4.8  3.5 - 5.2 g/dL Final    ALT (SGPT) 03/13/2023 20  0 - 33 U/L Final    AST (SGOT) 03/13/2023 20  0 - 32 U/L Final    Alkaline Phosphatase 03/13/2023 60  38 - 116 U/L Final    Total Bilirubin 03/13/2023 0.3  0.2 - 1.2 mg/dL Final    Globulin 03/13/2023 2.8  1.8 - 3.5 gm/dL Final    A/G Ratio 03/13/2023 1.7  1.1 - 2.4 g/dL Final    BUN/Creatinine Ratio 03/13/2023 26.3  7.3 - 30.0 Final    Anion Gap 03/13/2023 13.6  5.0 - 15.0 mmol/L Final    eGFR 03/13/2023 93.3  >60.0 mL/min/1.73 Final     WBC 03/13/2023 7.06  3.40 - 10.80 10*3/mm3 Final    RBC 03/13/2023 4.93  3.77 - 5.28 10*6/mm3 Final    Hemoglobin 03/13/2023 15.3  12.0 - 15.9 g/dL Final    Hematocrit 03/13/2023 45.9  34.0 - 46.6 % Final    MCV 03/13/2023 93.1  79.0 - 97.0 fL Final    MCH 03/13/2023 31.0  26.6 - 33.0 pg Final    MCHC 03/13/2023 33.3  31.5 - 35.7 g/dL Final    RDW 03/13/2023 12.6  12.3 - 15.4 % Final    RDW-SD 03/13/2023 43.1  37.0 - 54.0 fl Final    MPV 03/13/2023 10.3  6.0 - 12.0 fL Final    Platelets 03/13/2023 215  140 - 450 10*3/mm3 Final    Neutrophil % 03/13/2023 66.5  42.7 - 76.0 % Final    Lymphocyte % 03/13/2023 24.5  19.6 - 45.3 % Final    Monocyte % 03/13/2023 5.2  5.0 - 12.0 % Final    Eosinophil % 03/13/2023 2.7  0.3 - 6.2 % Final    Basophil % 03/13/2023 0.7  0.0 - 1.5 % Final    Immature Grans % 03/13/2023 0.4  0.0 - 0.5 % Final    Neutrophils, Absolute 03/13/2023 4.69  1.70 - 7.00 10*3/mm3 Final    Lymphocytes, Absolute 03/13/2023 1.73  0.70 - 3.10 10*3/mm3 Final    Monocytes, Absolute 03/13/2023 0.37  0.10 - 0.90 10*3/mm3 Final    Eosinophils, Absolute 03/13/2023 0.19  0.00 - 0.40 10*3/mm3 Final    Basophils, Absolute 03/13/2023 0.05  0.00 - 0.20 10*3/mm3 Final    Immature Grans, Absolute 03/13/2023 0.03  0.00 - 0.05 10*3/mm3 Final    nRBC 03/13/2023 0.0  0.0 - 0.2 /100 WBC Final        No radiology results for the last 30 days.   1/17/20221700-FHW-DQA 5.98, hemoglobin 16.2, platelets 231  1/17/2022 estradiol 21.5  LH 50.4  FSH 98.3      Assessment & Plan        *Focal atypical lobular hyperplasia  Status post excision of the abnormal area of enhancement noted on the MRI.  No residual ALH seen.  Given that she only had focal ALH risk of breast cancer in the next 5 years is 5%, 10 years is 12% and lifetime is 24%.  However she also has a family history of breast cancer.  Due to this the Gideon lifetime risk of breast cancer is around 37%.  Explained to her about the elevated risk of breast cancer.  Discussed  risk reduction with tamoxifen versus AI's.  She has been on tamoxifen 5 mg dose since January 2022  We discussed about increasing the dose to 10 mg daily.  She is willing to do the same.  She is tolerating tamoxifen well without any significant issues.  However if she has any problems with the 10 mg dose then we will back off to 5 mg.  Screening MRI November 2022 benign.  Screening mammogram performed April 2023, patient reports it was benign we will request a copy of those results.  Patient returns 9/11/2023 reporting an episode of nipple discharge from her right breast a few weeks ago.    Right breast diagnostic mammogram and ultrasound was ordered however patient did not undergo that testing.  She reports that the nipple discharge has resolved completely.  She had a breast MRI in November 2023 which was benign.  Scheduled for mammogram in May 2024.  No evidence of malignancy  Continues on tamoxifen 10 mg daily    *Family history of breast cancer  Genetic testing performed and patient as well as her mother were negative.  No changes in family history    *Hypertension-blood pressure elevated at 168/109, she is asymptomatic.  Previous values have also been high.  Recommend that she maintain a log at home and discuss with her primary care physician about maybe need for antihypertensive medications.      PLAN:  Screening mammogram due May 2024  Continue tamoxifen  MRI due in November 2024  APRN in 6 months and MD in 1 year.      Patient is on a high risk medication requiring close monitoring for toxicity.      Mikki Santiago MD  03/18/2024

## 2024-06-12 ENCOUNTER — OFFICE VISIT (OUTPATIENT)
Dept: MAMMOGRAPHY | Facility: CLINIC | Age: 54
End: 2024-06-12
Payer: COMMERCIAL

## 2024-06-12 VITALS
WEIGHT: 165 LBS | DIASTOLIC BLOOD PRESSURE: 106 MMHG | HEART RATE: 100 BPM | BODY MASS INDEX: 31.15 KG/M2 | OXYGEN SATURATION: 97 % | SYSTOLIC BLOOD PRESSURE: 165 MMHG | HEIGHT: 61 IN

## 2024-06-12 DIAGNOSIS — N60.91 ATYPICAL LOBULAR HYPERPLASIA (ALH) OF RIGHT BREAST: ICD-10-CM

## 2024-06-12 DIAGNOSIS — Z91.89 INCREASED RISK OF BREAST CANCER: Primary | ICD-10-CM

## 2024-06-12 DIAGNOSIS — F41.9 ANXIETY: ICD-10-CM

## 2024-06-12 PROCEDURE — 99213 OFFICE O/P EST LOW 20 MIN: CPT | Performed by: SURGERY

## 2024-06-12 RX ORDER — DIAZEPAM 5 MG/1
5 TABLET ORAL
Qty: 2 TABLET | Refills: 0 | Status: SHIPPED | OUTPATIENT
Start: 2024-06-12

## 2024-06-12 NOTE — PROGRESS NOTES
Subjective   Mitra Celis is a 54 y.o. female     History of Present Illness   She is a nice 54-year-old white female noted to be at high risk for developing breast cancer.  She has a personal history of atypical lobular hyperplasia diagnosed from an MRI abnormality in 2021.  There is also a strong family history for breast cancer.  The patient has had negative genetic testing.    Her imaging is in good order.  Mammograms were performed in May 2024 and I have personally reviewed those imaging studies along with the reports.  There are scattered fibroglandular densities.  I did not see any worrisome masses, suspicious microcalcifications, or areas of architectural distortion.    The patient describes an occasional nodularity in the right axilla.  She can feel some areas that increase in size and then they go away.  This is not associated with any particular pain.    The patient also takes tamoxifen 10 mg/day for chemoprevention and is now on a stable dosage and following closely with Dr. Santiago.  I reviewed notes from her last visit with Dr. Santiago in March.      Review of Systems constitutional-no unusual changes in weight or appetite.  Past Medical History:   Diagnosis Date    Abnormal Pap smear of cervix 1998    Allergic rhinitis     Anxiety and depression     COVID 11/2020    Depression     GERD (gastroesophageal reflux disease)     Headache     Migraines      Past Surgical History:   Procedure Laterality Date    BREAST BIOPSY      BREAST BIOPSY Right 12/20/2021    Procedure: Right breast needle-localized excisional biopsy;  Surgeon: Kathy Costa MD;  Location: [facility];  Service: General;  Laterality: Right;    DILATATION AND CURETTAGE      ENDOMETRIAL CRYOABLATION  1998    EYE SURGERY      LASIK    MOLE REMOVAL      SINUS SURGERY  2012    SKIN BIOPSY       Family History   Problem Relation Age of Onset    Breast cancer Mother         Diagnosed at 60 and 80     Breast cancer Maternal Aunt      Cancer Maternal Aunt         Spine Cancer     Breast cancer Maternal Grandmother         Daignosed in her 60s    Diabetes Paternal Grandmother     Breast cancer Maternal Great-Grandmother     Malig Hyperthermia Neg Hx      Social History     Socioeconomic History    Marital status:      Spouse name: Jaret    Number of children: 0   Tobacco Use    Smoking status: Never    Smokeless tobacco: Never   Vaping Use    Vaping status: Never Used   Substance and Sexual Activity    Alcohol use: Yes     Comment: SOCIAL (Wine/[place])    Drug use: Never    Sexual activity: Defer       Objective   Physical Exam  BMI is >= 30 and <35. (Class 1 Obesity). The following options were offered after discussion;: weight loss educational material (shared in after visit summary)  Right breast-medium size and symmetrical.  The skin of the breast looks normal.  With the arms raised and the pectoralis muscle contracted, there was no skin dimpling or nipple retraction.  I could not express any nipple discharge either.  There were no worrisome palpable masses detected on exam.  The right axilla was carefully palpated and I just do not feel anything there that is concerning.  Left breast-medium size and symmetrical without changes to the skin of the breast.  With the arms maneuvered, there was no skin dimpling or nipple retraction.  I did not palpate any worrisome masses in the breast and there was no nipple discharge.  Lymphatics-no cervical or axillary adenopathy.    Assessment & Plan   1.  At high risk for breast cancer with a personal history of atypical lobular hyperplasia.  Her physical examination is fine today.  Her imaging is also negative.  I will place orders for her next MRI.  I would like to see her back in the office in 1 year.  She will continue following with Dr. Santiago and remain on tamoxifen.  2.  Mild obesity-information on preventing unhealthy weight gain was included in her after visit summary.  The primary encounter  diagnosis was Increased risk of breast cancer. A diagnosis of Atypical lobular hyperplasia (ALH) of right breast was also pertinent to this visit.

## 2024-06-12 NOTE — LETTER
June 12, 2024     Garrick Oneal MD  [address]  [facility] [address]    Patient: Mitra Celis   YOB: 1970   Date of Visit: 6/12/2024     Dear Garrick Oneal MD:       Thank you for referring Mitra Celis to me for evaluation. Below are the relevant portions of my assessment and plan of care.    If you have questions, please do not hesitate to call me. I look forward to following Mitra along with you.         Sincerely,        Fidencio Bazan MD        CC: MD Hortensia Wild, Fidencio GRIMM MD  06/12/24 1017  Sign when Signing Visit  Subjective  Mitra Celis is a 54 y.o. female     History of Present Illness   She is a nice 54-year-old white female noted to be at high risk for developing breast cancer.  She has a personal history of atypical lobular hyperplasia diagnosed from an MRI abnormality in 2021.  There is also a strong family history for breast cancer.  The patient has had negative genetic testing.    Her imaging is in good order.  Mammograms were performed in May 2024 and I have personally reviewed those imaging studies along with the reports.  There are scattered fibroglandular densities.  I did not see any worrisome masses, suspicious microcalcifications, or areas of architectural distortion.    The patient describes an occasional nodularity in the right axilla.  She can feel some areas that increase in size and then they go away.  This is not associated with any particular pain.    The patient also takes tamoxifen 10 mg/day for chemoprevention and is now on a stable dosage and following closely with Dr. Santiago.  I reviewed notes from her last visit with Dr. Santiago in March.      Review of Systems constitutional-no unusual changes in weight or appetite.  Past Medical History:   Diagnosis Date   • Abnormal Pap smear of cervix 1998   • Allergic rhinitis    • Anxiety and depression    • COVID 11/2020   • Depression    • GERD (gastroesophageal reflux  disease)    • Headache    • Migraines      Past Surgical History:   Procedure Laterality Date   • BREAST BIOPSY     • BREAST BIOPSY Right 12/20/2021    Procedure: Right breast needle-localized excisional biopsy;  Surgeon: Kathy Costa MD;  Location: [facility];  Service: General;  Laterality: Right;   • DILATATION AND CURETTAGE     • ENDOMETRIAL CRYOABLATION  1998   • EYE SURGERY      LASIK   • MOLE REMOVAL     • SINUS SURGERY  2012   • SKIN BIOPSY       Family History   Problem Relation Age of Onset   • Breast cancer Mother         Diagnosed at 60 and 80    • Breast cancer Maternal Aunt    • Cancer Maternal Aunt         Spine Cancer    • Breast cancer Maternal Grandmother         Daignosed in her 60s   • Diabetes Paternal Grandmother    • Breast cancer Maternal Great-Grandmother    • Malig Hyperthermia Neg Hx      Social History     Socioeconomic History   • Marital status:      Spouse name: Jaret   • Number of children: 0   Tobacco Use   • Smoking status: Never   • Smokeless tobacco: Never   Vaping Use   • Vaping status: Never Used   Substance and Sexual Activity   • Alcohol use: Yes     Comment: SOCIAL (Wine/[place])   • Drug use: Never   • Sexual activity: Defer       Objective  Physical Exam  BMI is >= 30 and <35. (Class 1 Obesity). The following options were offered after discussion;: weight loss educational material (shared in after visit summary)  Right breast-medium size and symmetrical.  The skin of the breast looks normal.  With the arms raised and the pectoralis muscle contracted, there was no skin dimpling or nipple retraction.  I could not express any nipple discharge either.  There were no worrisome palpable masses detected on exam.  The right axilla was carefully palpated and I just do not feel anything there that is concerning.  Left breast-medium size and symmetrical without changes to the skin of the breast.  With the arms maneuvered, there was no skin dimpling or nipple  retraction.  I did not palpate any worrisome masses in the breast and there was no nipple discharge.  Lymphatics-no cervical or axillary adenopathy.    Assessment & Plan  1.  At high risk for breast cancer with a personal history of atypical lobular hyperplasia.  Her physical examination is fine today.  Her imaging is also negative.  I will place orders for her next MRI.  I would like to see her back in the office in 1 year.  She will continue following with Dr. Santiago and remain on tamoxifen.  2.  Mild obesity-information on preventing unhealthy weight gain was included in her after visit summary.  The primary encounter diagnosis was Increased risk of breast cancer. A diagnosis of Atypical lobular hyperplasia (ALH) of right breast was also pertinent to this visit.

## 2024-10-15 ENCOUNTER — OFFICE VISIT (OUTPATIENT)
Dept: ONCOLOGY | Facility: CLINIC | Age: 54
End: 2024-10-15
Payer: COMMERCIAL

## 2024-10-15 VITALS
WEIGHT: 172.6 LBS | HEIGHT: 61 IN | SYSTOLIC BLOOD PRESSURE: 119 MMHG | HEART RATE: 77 BPM | OXYGEN SATURATION: 96 % | DIASTOLIC BLOOD PRESSURE: 80 MMHG | RESPIRATION RATE: 16 BRPM | TEMPERATURE: 98.5 F | BODY MASS INDEX: 32.59 KG/M2

## 2024-10-15 DIAGNOSIS — N60.91 ATYPICAL LOBULAR HYPERPLASIA (ALH) OF RIGHT BREAST: Primary | ICD-10-CM

## 2024-10-15 RX ORDER — GABAPENTIN 100 MG/1
100 CAPSULE ORAL DAILY
COMMUNITY
Start: 2024-10-04

## 2024-10-15 NOTE — PROGRESS NOTES
Subjective   Mitra Celis is a 54 y.o. female.  Referred by Dr. Costa for discussing risk reduction.    Follow-up     Ms. 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  Her maternal grandmother had breast cancer in his 60s and maternal great grandmother with history of breast cancer.  Given her family history she has been on high risk screening including annual mammograms and MRIs.  She underwent genetic testing with [email or web address] Common herditary cancers panel on 6/13/2018 and was negative.    3/16/2021-bilateral screening mammogram-benign.    9/27/2021-bilateral breast MRI-linear enhancement measuring 2.1 x 0.5 x 0.4 cm in the right breast at the 3:30 position, 5 cm from the nipple.  MR guided biopsy recommended.  No abnormalities of the left breast.    10/12/2021-MRI biopsy of the right breast abnormality.  Pathology consistent with focal atypical lobular hyperplasia.  Benign breast parenchyma with scattered hyalinized stromal fibrosis and focal clustered microcysts.  Microcalcifications associated benign breast ducts.    She was evaluated by Dr. Costa and since there was an abnormal enhancement on the MRI in the area of ALH it was decided to proceed with lumpectomy.    12/20/2021-right breast needle localized lumpectomy.  Benign breast parenchyma with biopsy site changes, stromal fibrosis and fibroadenomatoid and usual ductal hyperplasia.  No evidence of atypical hyperplasia, in situ or invasive carcinoma.    She has been referred to medical oncology for discussing risk reduction given strong family history and focal atypical lobular hyperplasia.    Maricarmen Meyer risk estimate has been calculated and lifetime risk of breast cancer being 37%    Tamoxifen started January 2022    Interval history  She returns today for 6-month follow-up. At  her last visit with Dr. Santiago 6 months ago, she increased tamoxifen to 10mg daily. She did not tolerate it well and developed increase vaginal discharge, bloating, and weight gain. About 6 weeks ago, she went back to 10mg every other day and is tolerating this well. Denies the above symptoms or new concerns. She does report hip pain on and off which she has had for years. She was having trouble sleeping and recently saw her PCP who started her on gabapentin. She is sleeping much better and is currently taking 300mg nightly. This has significantly helped her.       The following portions of the patient's history were reviewed and updated as appropriate: allergies, current medications, past family history, past medical history, past social history, past surgical history and problem list.    Past Medical History:   Diagnosis Date    Abnormal Pap smear of cervix 1998    Allergic rhinitis     Anxiety and depression     COVID 11/2020    Depression     GERD (gastroesophageal reflux disease)     Headache     Migraines         Past Surgical History:   Procedure Laterality Date    BREAST BIOPSY      BREAST BIOPSY Right 12/20/2021    Procedure: Right breast needle-localized excisional biopsy;  Surgeon: Kathy Costa MD;  Location: [facility];  Service: General;  Laterality: Right;    DILATATION AND CURETTAGE      ENDOMETRIAL CRYOABLATION  1998    EYE SURGERY      LASIK    MOLE REMOVAL      SINUS SURGERY  2012    SKIN BIOPSY          Family History   Problem Relation Age of Onset    Breast cancer Mother         Diagnosed at 60 and 80     Breast cancer Maternal Aunt     Cancer Maternal Aunt         Spine Cancer     Breast cancer Maternal Grandmother         Daignosed in her 60s    Diabetes Paternal Grandmother     Breast cancer Maternal Great-Grandmother     Malig Hyperthermia Neg Hx         Social History     Socioeconomic History    Marital status:      Spouse name: Jaret    Number of children: 0   Tobacco  "Use    Smoking status: Never    Smokeless tobacco: Never   Vaping Use    Vaping status: Never Used   Substance and Sexual Activity    Alcohol use: Yes     Comment: SOCIAL ([employer]/[place])    Drug use: Never    Sexual activity: Defer        OB History               Para        Term   0            AB        Living             SAB        IAB        Ectopic        Molar        Multiple        Live Births                Age at menarche 10  Age at first childbirth-not applicable   1 para 0  1  Age at menopause-47  Oral contraceptive pill use for 20 years  Hormone replacement therapy-none    Allergies   Allergen Reactions    Aleve [Naproxen Sodium] Anaphylaxis and Other (See Comments)     syncope    Latex Rash     rash        Objective   Blood pressure 119/80, pulse 77, temperature 98.5 °F (36.9 °C), temperature source Oral, resp. rate 16, height 154.9 cm (60.98\"), weight 78.3 kg (172 lb 9.6 oz), SpO2 96%.   Physical Exam  Vitals reviewed.   Constitutional:       General: She is not in acute distress.     Appearance: Normal appearance. She is well-developed.   HENT:      Head: Normocephalic and atraumatic.      Mouth/Throat:      Pharynx: No oropharyngeal exudate.   Eyes:      Pupils: Pupils are equal, round, and reactive to light.   Pulmonary:      Effort: Pulmonary effort is normal.   Musculoskeletal:         General: Normal range of motion.      Cervical back: Normal range of motion.   Lymphadenopathy:      Upper Body:      Right upper body: No supraclavicular or axillary adenopathy.      Left upper body: No supraclavicular or axillary adenopathy.   Skin:     General: Skin is warm and dry.   Neurological:      Mental Status: She is alert and oriented to person, place, and time.   Psychiatric:         Mood and Affect: Mood normal.         Behavior: Behavior normal.         Thought Content: Thought content normal.         Judgment: Judgment normal.     She declined breast exam today. "     Previous breast exam:   Breast Exam: Right breast status post lumpectomy with periareolar incision.  Incision well-healed.  There is a seroma adjacent to the scar.  No other abnormalities.  No nipple discharge present on exam today.  Left breast appears normal .,  No palpable mass or nodularity.    I have reexamined the patient and the results are consistent with the previously documented exam. DIRK Almazan        Assessment & Plan      *Focal atypical lobular hyperplasia  Status post excision of the abnormal area of enhancement noted on the MRI.  No residual ALH seen.  Given that she only had focal ALH risk of breast cancer in the next 5 years is 5%, 10 years is 12% and lifetime is 24%.  However she also has a family history of breast cancer.  Due to this the [facility] lifetime risk of breast cancer is around 37%.  Explained to her about the elevated risk of breast cancer.  Discussed risk reduction with tamoxifen versus AI's.  She has been on tamoxifen 5 mg dose since January 2022  We discussed about increasing the dose to 10 mg daily.  She is willing to do the same.  She is tolerating tamoxifen well without any significant issues.  However if she has any problems with the 10 mg dose then we will back off to 5 mg.  Screening MRI November 2022 benign.  Screening mammogram performed April 2023, patient reports it was benign we will request a copy of those results.  Patient returns 9/11/2023 reporting an episode of nipple discharge from her right breast a few weeks ago.    Right breast diagnostic mammogram and ultrasound was ordered however patient did not undergo that testing.  She reports that the nipple discharge has resolved completely.  She had a breast MRI in November 2023 which was benign.  Scheduled for mammogram in May 2024.  No evidence of malignancy  Continues on tamoxifen 10 mg daily  10/15/2024: Continue tamoxifen 10 mg every other day as she did not tolerate daily.     *Family history of  breast cancer  Genetic testing performed and patient as well as her mother were negative.  No changes in family history    *Hypertension-blood pressure elevated at 168/109, she is asymptomatic.  Previous values have also been high.  Recommend that she maintain a log at home and discuss with her primary care physician about maybe need for antihypertensive medications.  10/15/2024: /80 today.       PLAN:  Continue tamoxifen 10mg every other day   MRI scheduled 11/11/2024.  Screening mammogram due 5/2024.   Return to clinic in 6 months for MD visit.   Instructed to reach out sooner with any new concerns.     Patient is on a high risk medication requiring close monitoring for toxicity.      Barb Alaniz, APRN  10/15/2024

## 2024-11-11 ENCOUNTER — HOSPITAL ENCOUNTER (OUTPATIENT)
Dept: MRI IMAGING | Facility: HOSPITAL | Age: 54
Discharge: HOME | End: 2024-11-11
Admitting: INTERNAL MEDICINE
Payer: COMMERCIAL

## 2024-11-11 DIAGNOSIS — Z91.89 INCREASED RISK OF BREAST CANCER: ICD-10-CM

## 2024-11-11 DIAGNOSIS — N60.91 ATYPICAL LOBULAR HYPERPLASIA (ALH) OF RIGHT BREAST: ICD-10-CM

## 2024-11-11 PROCEDURE — 0 GADOBENATE DIMEGLUMINE 529 MG/ML SOLUTION: Performed by: INTERNAL MEDICINE

## 2024-11-11 PROCEDURE — 77049 MRI BREAST C-+ W/CAD BI: CPT

## 2024-11-11 PROCEDURE — A9577 INJ MULTIHANCE: HCPCS | Performed by: INTERNAL MEDICINE

## 2024-11-11 RX ADMIN — GADOBENATE DIMEGLUMINE 16 ML: 529 INJECTION, SOLUTION INTRAVENOUS at 10:53

## 2025-02-26 ENCOUNTER — TELEPHONE (OUTPATIENT)
Dept: ONCOLOGY | Facility: CLINIC | Age: 55
End: 2025-02-26
Payer: COMMERCIAL

## 2025-02-26 NOTE — TELEPHONE ENCOUNTER
Caller: Mitra Celis    Relationship to patient: Self    Best call back number: [phone]    Chief complaint: RESCHEDULE     Type of visit: LAB AND FOLLOW UP     Requested date: NEXT AVAILABLE      If rescheduling, when is the original appointment: 3-24     Additional notes:PLEASE ADVISE

## 2025-03-14 RX ORDER — TAMOXIFEN CITRATE 10 MG/1
10 TABLET ORAL EVERY OTHER DAY
Qty: 45 TABLET | Refills: 3 | Status: SHIPPED | OUTPATIENT
Start: 2025-03-14

## 2025-04-01 ENCOUNTER — OFFICE VISIT (OUTPATIENT)
Dept: ONCOLOGY | Facility: CLINIC | Age: 55
End: 2025-04-01
Payer: COMMERCIAL

## 2025-04-01 VITALS
HEART RATE: 77 BPM | TEMPERATURE: 98 F | DIASTOLIC BLOOD PRESSURE: 78 MMHG | SYSTOLIC BLOOD PRESSURE: 110 MMHG | OXYGEN SATURATION: 98 % | HEIGHT: 61 IN | BODY MASS INDEX: 31.93 KG/M2 | WEIGHT: 169.1 LBS | RESPIRATION RATE: 16 BRPM

## 2025-04-01 DIAGNOSIS — N60.91 ATYPICAL LOBULAR HYPERPLASIA (ALH) OF RIGHT BREAST: Primary | ICD-10-CM

## 2025-04-01 DIAGNOSIS — Z91.89 INCREASED RISK OF BREAST CANCER: ICD-10-CM

## 2025-04-01 PROCEDURE — 99214 OFFICE O/P EST MOD 30 MIN: CPT | Performed by: INTERNAL MEDICINE

## 2025-04-01 RX ORDER — METOPROLOL SUCCINATE 50 MG/1
1 TABLET, EXTENDED RELEASE ORAL DAILY
COMMUNITY
Start: 2025-01-28

## 2025-04-01 RX ORDER — VALSARTAN AND HYDROCHLOROTHIAZIDE 160; 25 MG/1; MG/1
1 TABLET ORAL DAILY
COMMUNITY
Start: 2025-01-17

## 2025-04-01 NOTE — PROGRESS NOTES
Subjective   Mitra Celis is a 55 y.o. female.  Referred by Dr. Costa for discussing risk reduction.    Primary Care Follow-Up     Ms. Celis is a 55-year-old postmenopausal  lady with a strong family history of breast cancer including her mother being diagnosed with breast cancer at the age of 59, 60 and a rsecond breast cancer at the age of 81.  Her maternal grandmother had breast cancer in his 60s and maternal great grandmother with history of breast cancer.  Given her family history she has been on high risk screening including annual mammograms and MRIs.  She underwent genetic testing with [employer] Common herditary cancers panel on 6/13/2018 and was negative.    3/16/2021-bilateral screening mammogram-benign.    9/27/2021-bilateral breast MRI-linear enhancement measuring 2.1 x 0.5 x 0.4 cm in the right breast at the 3:30 position, 5 cm from the nipple.  MR guided biopsy recommended.  No abnormalities of the left breast.    10/12/2021-MRI biopsy of the right breast abnormality.  Pathology consistent with focal atypical lobular hyperplasia.  Benign breast parenchyma with scattered hyalinized stromal fibrosis and focal clustered microcysts.  Microcalcifications associated benign breast ducts.    She was evaluated by Dr. Costa and since there was an abnormal enhancement on the MRI in the area of ALH it was decided to proceed with lumpectomy.    12/20/2021-right breast needle localized lumpectomy.  Benign breast parenchyma with biopsy site changes, stromal fibrosis and fibroadenomatoid and usual ductal hyperplasia.  No evidence of atypical hyperplasia, in situ or invasive carcinoma.    She has been referred to medical oncology for discussing risk reduction given strong family history and focal atypical lobular hyperplasia.    Maricarmen Meyer risk estimate has been calculated and lifetime risk of breast cancer being 37%    Tamoxifen started January 2022    Interval history  Mitra returns today for  follow-up.  She continues on tamoxifen 5 mg daily dose and tolerating that well.  She had some hot flashes for which she was started on gabapentin by her gynecologist and she is tolerating that well and hot flashes have improved.  She tried going up to 10 mg of tamoxifen however this resulted in increased vaginal secretions and hence she backed off and currently taking 10 mg every other day.  Denies any new breast masses  She is scheduled for screening mammogram in May 2025  November 2024 bilateral breast MRI was benign.      The following portions of the patient's history were reviewed and updated as appropriate: allergies, current medications, past family history, past medical history, past social history, past surgical history and problem list.    Past Medical History:   Diagnosis Date    Abnormal Pap smear of cervix 1998    Allergic rhinitis     Anxiety and depression     COVID 11/2020    Depression     GERD (gastroesophageal reflux disease)     Headache     Migraines         Past Surgical History:   Procedure Laterality Date    BREAST BIOPSY      BREAST BIOPSY Right 12/20/2021    Procedure: Right breast needle-localized excisional biopsy;  Surgeon: Kathy Costa MD;  Location: [facility];  Service: General;  Laterality: Right;    DILATATION AND CURETTAGE      ENDOMETRIAL CRYOABLATION  1998    EYE SURGERY      LASIK    MOLE REMOVAL      SINUS SURGERY  2012    SKIN BIOPSY          Family History   Problem Relation Age of Onset    Breast cancer Mother         Diagnosed at 60 and 80     Breast cancer Maternal Aunt     Cancer Maternal Aunt         Spine Cancer     Breast cancer Maternal Grandmother         Daignosed in her 60s    Diabetes Paternal Grandmother     Breast cancer Maternal Great-Grandmother     Malig Hyperthermia Neg Hx         Social History     Socioeconomic History    Marital status:      Spouse name: Jaret    Number of children: 0   Tobacco Use    Smoking status: Never    Smokeless  "tobacco: Never   Vaping Use    Vaping status: Never Used   Substance and Sexual Activity    Alcohol use: Yes     Comment: SOCIAL (Wine[place])    Drug use: Never    Sexual activity: Defer        OB History               Para        Term   0            AB        Living             SAB        IAB        Ectopic        Molar        Multiple        Live Births                Age at menarche 10  Age at first childbirth-not applicable   1 para 0  1  Age at menopause-47  Oral contraceptive pill use for 20 years  Hormone replacement therapy-none    Allergies   Allergen Reactions    Aleve [Naproxen Sodium] Anaphylaxis and Other (See Comments)     syncope    Latex Rash     rash        Objective   Blood pressure 110/78, pulse 77, temperature 98 °F (36.7 °C), temperature source Oral, resp. rate 16, height 154.9 cm (60.98\"), weight 76.7 kg (169 lb 1.6 oz), SpO2 98%.   Physical Exam  Vitals reviewed.   Constitutional:       General: She is not in acute distress.     Appearance: Normal appearance. She is well-developed.   HENT:      Head: Normocephalic and atraumatic.      Mouth/Throat:      Pharynx: No oropharyngeal exudate.   Eyes:      Pupils: Pupils are equal, round, and reactive to light.   Pulmonary:      Effort: Pulmonary effort is normal.   Musculoskeletal:         General: Normal range of motion.      Cervical back: Normal range of motion.   Lymphadenopathy:      Upper Body:      Right upper body: No supraclavicular or axillary adenopathy.      Left upper body: No supraclavicular or axillary adenopathy.   Skin:     General: Skin is warm and dry.   Neurological:      Mental Status: She is alert and oriented to person, place, and time.   Psychiatric:         Mood and Affect: Mood normal.         Behavior: Behavior normal.         Thought Content: Thought content normal.         Judgment: Judgment normal.     She declined breast exam today.     Previous breast exam:   Breast Exam: Right breast " status post lumpectomy with periareolar incision.  Incision well-healed.  There is a seroma adjacent to the scar.  No other abnormalities.  No nipple discharge present on exam today.  Left breast appears normal .,  No palpable mass or nodularity.    I have reexamined the patient and the results are consistent with the previously documented exam. Mikki Santiago MD        Assessment & Plan      *Focal atypical lobular hyperplasia  Status post excision of the abnormal area of enhancement noted on the MRI.  No residual ALH seen.  Given that she only had focal ALH risk of breast cancer in the next 5 years is 5%, 10 years is 12% and lifetime is 24%.  However she also has a family history of breast cancer.  Due to this the [facility] lifetime risk of breast cancer is around 37%.  Explained to her about the elevated risk of breast cancer.  Discussed risk reduction with tamoxifen versus AI's.  She has been on tamoxifen 5 mg dose since January 2022  She tried increased dose of tamoxifen of 10 mg but unable to tolerate due to hot flashes and increased secretions  Went back to taking tamoxifen 10 mg every other day  Tolerating the tamoxifen 5 mg daily dose well.  Continue the same  Up-to-date on screening mammograms and MRIs    *Family history of breast cancer  Genetic testing performed and patient as well as her mother were negative.  No changes in family history    *Hypertension-blood pressure elevated at 168/109, she is asymptomatic.  Previous values have also been high.  Recommend that she maintain a log at home and discuss with her primary care physician about maybe need for antihypertensive medications.  Blood pressure 110/78    *Hot flashes-secondary to tamoxifen  Currently on gabapentin  Continue the same      PLAN:  Continue tamoxifen 10mg every other day   MRI due November 2025, ordered and scheduled today  Mammogram due May 2025, ordered already    Patient is on a high risk medication requiring close monitoring  for toxicity.      Mikki Santiago MD  04/01/2025

## 2025-06-16 NOTE — PROGRESS NOTES
[facility]     Referring Provider:      Chief complaint: SUE, high risk     HPI: Patient presenting to the office today for routine follow-up.  She was previously being followed by Dr. Bazan in the high-risk clinic and [facility] her care has been transferred to me.  She just had a bilateral screening mammogram on 5/15/2025 that resulted as BI-RADS 0 for a new mass in the left breast and suggested diagnostic imaging.  We will get this set up for her today.  She last saw her oncologist in April no changes were made.  She is currently in tamoxifen and tolerating it well.  MRI was scheduled for November.  She has a left dx mammo and left limited us scheduled for tomorrow.    I personally reviewed her records and summarized her relevant breast history/imagin2018 [facility] genetic testing  Negative    2024 breast MRI at [facility]  FINDINGS: There is scattered fibroglandular tissue. There is minimal  background parenchymal enhancement.  RIGHT BREAST:    No suspicious enhancing mass or area of non-mass enhancement is  identified.  The visualized axilla is within normal limits.  LEFT BREAST:    No suspicious enhancing mass or area of non-mass enhancement is  identified.  The visualized axilla is within normal limits.  EXTRAMAMMARY FINDINGS:  There are no pathologically enlarged internal mammary chain lymph nodes  on either side.    IMPRESSION AND RECOMMENDATION:  No MRI evidence of malignancy in either breast. Recommend annual  screening mammogram in May 2025.  BI-RADS Category 1:    5/15/2025 bilateral screening mammogram at [facility]  There are scattered areas of fibroglandular density.  There is a new small, oval mass measuring 5 mm with circumscribed margins seen  in the posterior one-third region of the left breast at 12 o'clock.  In the right breast, no suspicious masses, significant calcifications or other  abnormalities are seen.  IMPRESSION:  New mass in the left breast requires  additional evaluation. Diagnostic mammogram  and limited breast ultrasound is recommended.  BI-RADS Category 0    She has a personal history of 2021 her right breast benign biopsy,   2021 right breast ALH    She has a family history of breast cancer in her mother dx age 60 (negative genetics), maternal grandmother dx age unknown.  She denies any family history of ovarian cancer.          Review of Systems - Oncology    Medications:    Current Outpatient Medications:     B COMPLEX VITAMINS PO, Take 1 tablet by mouth Daily., Disp: , Rfl:     Calcium Carb-Cholecalciferol (CALTRATE 600+D3 PO), Take 1 tablet by mouth Daily. HOLDING FOR OR, Disp: , Rfl:     diazePAM (Valium) 5 MG tablet, Take 1 tablet by mouth 60 Minutes Prior to Surgery for 1 dose. Take 1 tablet 60 minutes prior to MRI, if no help may take the second tablet, Disp: 2 tablet, Rfl: 0    diazePAM (Valium) 5 MG tablet, Take 1 tablet by mouth 60 Minutes Prior to Surgery for 1 dose. If there is no benefit from the first tablet, you may take the second tablet.  Please be sure that you have someone to drive you home following the procedure., Disp: 2 tablet, Rfl: 0    fluticasone (FLONASE) 50 MCG/ACT nasal spray, Administer 2 sprays into the nostril(s) as directed by provider Daily As Needed for Rhinitis., Disp: , Rfl:     gabapentin (NEURONTIN) 100 MG capsule, Take 1 capsule by mouth Daily., Disp: , Rfl:     Loratadine (CLARITIN PO), Take 1 tablet by mouth., Disp: , Rfl:     metoprolol succinate XL (TOPROL-XL) 50 MG 24 hr tablet, Take 1 tablet by mouth Daily., Disp: , Rfl:     multivitamin with minerals tablet tablet, Take 1 tablet by mouth Daily. HOLDING FOR 7 DAYS PRIOR TO OR, Disp: , Rfl:     PROBIOTIC PRODUCT PO, Take 1 tablet by mouth Daily., Disp: , Rfl:     tamoxifen (NOLVADEX) 10 MG tablet, Take 1 tablet by mouth Every Other Day., Disp: 45 tablet, Rfl: 3    valsartan-hydrochlorothiazide (DIOVAN-HCT) 160-25 MG per tablet, Take 1 tablet by mouth Daily.,  Disp: , Rfl:     venlafaxine (EFFEXOR) 150 MG tablet sustained-release 24 hour 24 hr tablet, Take 1 tablet by mouth Daily. with food, Disp: , Rfl:     Allergies:  Allergies   Allergen Reactions    Aleve [Naproxen Sodium] Anaphylaxis and Other (See Comments)     syncope    Latex Rash     rash       Medical history:  Past Medical History:   Diagnosis Date    Abnormal Pap smear of cervix     Allergic rhinitis     Anxiety and depression     COVID 2020    Depression     GERD (gastroesophageal reflux disease)     Headache     Migraines        Surgical History:  Past Surgical History:   Procedure Laterality Date    BREAST BIOPSY      BREAST BIOPSY Right 2021    Procedure: Right breast needle-localized excisional biopsy;  Surgeon: Kathy Costa MD;  Location: [facility];  Service: General;  Laterality: Right;    DILATATION AND CURETTAGE      ENDOMETRIAL CRYOABLATION      EYE SURGERY      LASIK    MOLE REMOVAL      SINUS SURGERY  2012    SKIN BIOPSY         Family History:  Family History   Problem Relation Age of Onset    Breast cancer Mother         Diagnosed at 60 and 80     Breast cancer Maternal Aunt     Cancer Maternal Aunt         Spine Cancer     Breast cancer Maternal Grandmother         Daignosed in her 60s    Diabetes Paternal Grandmother     Breast cancer Maternal Great-Grandmother     Malig Hyperthermia Neg Hx        Social History:   Social History     Socioeconomic History    Marital status:      Spouse name: Jaret    Number of children: 0   Tobacco Use    Smoking status: Never    Smokeless tobacco: Never   Vaping Use    Vaping status: Never Used   Substance and Sexual Activity    Alcohol use: Yes     Comment: SOCIAL (Wine/[place])    Drug use: Never    Sexual activity: Defer     Patient drinks 1 servings of caffeine per day.       GYNECOLOGIC HISTORY:   . P: 0. AB: 1.  Last menstrual period: 41  Age at menarche: 10  Age at first childbirth: N/A  Lactation/How long:  N/A  Age at menopause: 41  Total years of oral contraceptive use: 20 years  Total years of hormone replacement therapy: N/A      Physical Exam  There were no vitals filed for this visit.  ECOG 0 - Asymptomatic  General: NAD, well appearing  Psych: a&o x 3, normal mood and affect  Eyes: EOMI, no scleral icterus  ENMT: neck supple without masses or thyromegaly, mucus membranes moist  Resp: normal effort, CTAB  CV: RRR, no murmurs, no edema   GI: soft, NT, ND  MSK: normal gait, normal ROM in bilateral shoulders  Lymph nodes: no cervical, supraclavicular or axillary lymphadenopathy  Breast: symmetric,   Right: No visible abnormalities on inspection while seated, with arms raised or hands on hips. No masses, skin changes, or nipple abnormalities.  Left: No visible abnormalities on inspection while seated, with arms raised or hands on hips. No masses, skin changes, or nipple abnormalities.      Assessment:    ALH, right breast  High risk for breast cancer  Abnormal breast imaging    Discussion:  We discussed management options for individuals who are at increased risk (>20% lifetime risk):  1) High risk screening - Annual mammogram and annual breast MRI, alternating one test every 6 months, biannual clinical exam and monthly self breast exam. She meets criteria for high risk screening.  2) Chemoprevention with Tamoxifen, Raloxifene or Exemestane. These may reduce risk up to 50%. I reviewed that these particular medications are not without risks and the risk/benefit ratio must be considered carefully. She is not interested in this currently.  3) Risk reducing surgery such as prophylactic mastectomy  which may reduce risk by 90-95%. We discussed that this is a relatively radical strategy and is generally reserved for individuals with a known genetic mutation predisposing them to an increased risk (~50% risk) of breast cancer. She does not meet criteria for risk reducing surgery.   4) I discussed the importance of exercise  and weight management as part of a risk reducing strategy, since increased BMI is associated with an increased risk of breast cancer.     Plan:  MRI November, ordered by Dr. Santiago- call me after MRI so we can set her up for MRI and exam 1 year later  Left diagnostic mammogram and left limited breast us tomorrow ordered by OBGYDIRK Souza    I have spent 35 mins in face to face time with the patient and in chart review.    CC:  No ref. provider found  Garrick Oneal MD    EMR Dragon/transcription disclaimer:  Dictated using Dragon dictation

## 2025-06-17 ENCOUNTER — OFFICE VISIT (OUTPATIENT)
Dept: SURGERY | Facility: CLINIC | Age: 55
End: 2025-06-17
Payer: COMMERCIAL

## 2025-06-17 VITALS
SYSTOLIC BLOOD PRESSURE: 122 MMHG | DIASTOLIC BLOOD PRESSURE: 78 MMHG | HEART RATE: 87 BPM | BODY MASS INDEX: 31.91 KG/M2 | OXYGEN SATURATION: 98 % | HEIGHT: 61 IN | WEIGHT: 169 LBS

## 2025-06-17 DIAGNOSIS — Z91.89 INCREASED RISK OF BREAST CANCER: ICD-10-CM

## 2025-06-17 DIAGNOSIS — N60.91 ATYPICAL LOBULAR HYPERPLASIA (ALH) OF RIGHT BREAST: Primary | ICD-10-CM

## 2025-06-17 PROCEDURE — 99214 OFFICE O/P EST MOD 30 MIN: CPT | Performed by: NURSE PRACTITIONER

## 2025-06-18 ENCOUNTER — APPOINTMENT (OUTPATIENT)
Dept: WOMENS IMAGING | Facility: HOSPITAL | Age: 55
End: 2025-06-18
Payer: COMMERCIAL

## 2025-06-18 PROCEDURE — 77065 DX MAMMO INCL CAD UNI: CPT | Performed by: RADIOLOGY

## 2025-06-18 PROCEDURE — 76642 ULTRASOUND BREAST LIMITED: CPT | Performed by: RADIOLOGY

## 2025-06-18 PROCEDURE — G0279 TOMOSYNTHESIS, MAMMO: HCPCS | Performed by: RADIOLOGY

## 2025-06-18 PROCEDURE — 77061 BREAST TOMOSYNTHESIS UNI: CPT | Performed by: RADIOLOGY

## 2025-06-20 ENCOUNTER — TELEPHONE (OUTPATIENT)
Dept: SURGERY | Facility: CLINIC | Age: 55
End: 2025-06-20
Payer: COMMERCIAL

## 2025-06-20 ENCOUNTER — PREP FOR SURGERY (OUTPATIENT)
Dept: OTHER | Facility: HOSPITAL | Age: 55
End: 2025-06-20
Payer: COMMERCIAL

## 2025-06-20 DIAGNOSIS — R92.8 ABNORMAL FINDING ON BREAST IMAGING: Primary | ICD-10-CM

## 2025-06-20 NOTE — TELEPHONE ENCOUNTER
Spoke with patient regarding imaging results. Left breast cyst aspiration and possible left breast u/s biopsy as well.   All questions answered.     DIRK Polanco

## 2025-06-26 ENCOUNTER — APPOINTMENT (OUTPATIENT)
Dept: WOMENS IMAGING | Facility: HOSPITAL | Age: 55
End: 2025-06-26
Payer: COMMERCIAL

## 2025-06-26 PROCEDURE — 76942 ECHO GUIDE FOR BIOPSY: CPT | Performed by: RADIOLOGY

## 2025-06-26 PROCEDURE — 19000 PUNCTURE ASPIR CYST BREAST: CPT | Performed by: RADIOLOGY

## 2025-08-26 ENCOUNTER — PRE-ADMISSION TESTING (OUTPATIENT)
Dept: PREADMISSION TESTING | Facility: HOSPITAL | Age: 55
End: 2025-08-26
Payer: COMMERCIAL

## 2025-08-26 VITALS
BODY MASS INDEX: 30.13 KG/M2 | RESPIRATION RATE: 16 BRPM | DIASTOLIC BLOOD PRESSURE: 83 MMHG | HEIGHT: 61 IN | TEMPERATURE: 98.2 F | WEIGHT: 159.6 LBS | SYSTOLIC BLOOD PRESSURE: 122 MMHG | OXYGEN SATURATION: 96 % | HEART RATE: 86 BPM

## 2025-08-26 LAB
ABO GROUP BLD: NORMAL
ANION GAP SERPL CALCULATED.3IONS-SCNC: 9 MMOL/L (ref 5–15)
BASOPHILS # BLD AUTO: 0.04 10*3/MM3 (ref 0–0.2)
BASOPHILS NFR BLD AUTO: 0.8 % (ref 0–1.5)
BLD GP AB SCN SERPL QL: NEGATIVE
BUN SERPL-MCNC: 9 MG/DL (ref 6–20)
BUN/CREAT SERPL: 12 (ref 7–25)
CALCIUM SPEC-SCNC: 10.5 MG/DL (ref 8.6–10.5)
CHLORIDE SERPL-SCNC: 103 MMOL/L (ref 98–107)
CO2 SERPL-SCNC: 28 MMOL/L (ref 22–29)
CREAT SERPL-MCNC: 0.75 MG/DL (ref 0.57–1)
DEPRECATED RDW RBC AUTO: 44.2 FL (ref 37–54)
EGFRCR SERPLBLD CKD-EPI 2021: 93 ML/MIN/1.73
EOSINOPHIL # BLD AUTO: 0.18 10*3/MM3 (ref 0–0.4)
EOSINOPHIL NFR BLD AUTO: 3.5 % (ref 0.3–6.2)
ERYTHROCYTE [DISTWIDTH] IN BLOOD BY AUTOMATED COUNT: 12.7 % (ref 12.3–15.4)
GLUCOSE SERPL-MCNC: 118 MG/DL (ref 65–99)
HCT VFR BLD AUTO: 44.1 % (ref 34–46.6)
HGB BLD-MCNC: 14.7 G/DL (ref 12–15.9)
IMM GRANULOCYTES # BLD AUTO: 0.02 10*3/MM3 (ref 0–0.05)
IMM GRANULOCYTES NFR BLD AUTO: 0.4 % (ref 0–0.5)
LYMPHOCYTES # BLD AUTO: 1.55 10*3/MM3 (ref 0.7–3.1)
LYMPHOCYTES NFR BLD AUTO: 29.8 % (ref 19.6–45.3)
MCH RBC QN AUTO: 31.3 PG (ref 26.6–33)
MCHC RBC AUTO-ENTMCNC: 33.3 G/DL (ref 31.5–35.7)
MCV RBC AUTO: 93.8 FL (ref 79–97)
MONOCYTES # BLD AUTO: 0.34 10*3/MM3 (ref 0.1–0.9)
MONOCYTES NFR BLD AUTO: 6.5 % (ref 5–12)
NEUTROPHILS NFR BLD AUTO: 3.08 10*3/MM3 (ref 1.7–7)
NEUTROPHILS NFR BLD AUTO: 59 % (ref 42.7–76)
NRBC BLD AUTO-RTO: 0 /100 WBC (ref 0–0.2)
PLATELET # BLD AUTO: 229 10*3/MM3 (ref 140–450)
PMV BLD AUTO: 10.7 FL (ref 6–12)
POTASSIUM SERPL-SCNC: 5 MMOL/L (ref 3.5–5.2)
QT INTERVAL: 384 MS
QTC INTERVAL: 444 MS
RBC # BLD AUTO: 4.7 10*6/MM3 (ref 3.77–5.28)
RH BLD: POSITIVE
SODIUM SERPL-SCNC: 140 MMOL/L (ref 136–145)
T&S EXPIRATION DATE: NORMAL
WBC NRBC COR # BLD AUTO: 5.21 10*3/MM3 (ref 3.4–10.8)

## 2025-08-26 PROCEDURE — 85025 COMPLETE CBC W/AUTO DIFF WBC: CPT

## 2025-08-26 PROCEDURE — 80048 BASIC METABOLIC PNL TOTAL CA: CPT

## 2025-08-26 PROCEDURE — 36415 COLL VENOUS BLD VENIPUNCTURE: CPT

## 2025-08-26 PROCEDURE — 86901 BLOOD TYPING SEROLOGIC RH(D): CPT

## 2025-08-26 PROCEDURE — 86850 RBC ANTIBODY SCREEN: CPT

## 2025-08-26 PROCEDURE — 93005 ELECTROCARDIOGRAM TRACING: CPT

## 2025-08-26 PROCEDURE — 86900 BLOOD TYPING SEROLOGIC ABO: CPT

## 2025-08-26 RX ORDER — GABAPENTIN 300 MG/1
300 CAPSULE ORAL NIGHTLY
COMMUNITY
Start: 2025-08-26

## 2025-09-01 PROBLEM — R10.10 UPPER ABDOMINAL PAIN: Status: ACTIVE | Noted: 2025-09-01

## (undated) DEVICE — PK CHST BRST 40

## (undated) DEVICE — GOWN,SIRUS,NON REINFRCD,LARGE,SET IN SL: Brand: MEDLINE

## (undated) DEVICE — APPL CHLORAPREP HI/LITE 26ML ORNG

## (undated) DEVICE — LEGGINGS, PAIR, 31X48, STERILE: Brand: MEDLINE

## (undated) DEVICE — SUT SILK 2/0 SH 30IN K833H

## (undated) DEVICE — GAUZE,SPONGE,4"X4",16PLY,XRAY,STRL,LF: Brand: MEDLINE

## (undated) DEVICE — NDL HYPO ECLPS SFTY 22G 1 1/2IN

## (undated) DEVICE — PENCL E/S ULTRAVAC TELESCP NOSE HOLSTR 10FT

## (undated) DEVICE — SUT MNCRYL PLS ANTIB UD 4/0 PS2 18IN

## (undated) DEVICE — ANTIBACTERIAL UNDYED BRAIDED (POLYGLACTIN 910), SYNTHETIC ABSORBABLE SUTURE: Brand: COATED VICRYL

## (undated) DEVICE — GLV SURG SENSICARE POLYISPRN W/ALOE PF LF 6.5 GRN STRL

## (undated) DEVICE — GLV SURG SENSICARE PI MIC PF SZ6.5 LF STRL

## (undated) DEVICE — ELECTRD BLD EZ CLN MOD XLNG 2.75IN

## (undated) DEVICE — TRAP FLD MINIVAC MEGADYNE 100ML

## (undated) DEVICE — ADHS SKIN SURG TISS VISC PREMIERPRO EXOFIN HI/VISC FAST/DRY